# Patient Record
Sex: MALE | Race: BLACK OR AFRICAN AMERICAN | NOT HISPANIC OR LATINO | Employment: OTHER | URBAN - METROPOLITAN AREA
[De-identification: names, ages, dates, MRNs, and addresses within clinical notes are randomized per-mention and may not be internally consistent; named-entity substitution may affect disease eponyms.]

---

## 2017-01-13 ENCOUNTER — PATIENT OUTREACH (OUTPATIENT)
Dept: ADMINISTRATIVE | Facility: HOSPITAL | Age: 82
End: 2017-01-13

## 2017-01-13 DIAGNOSIS — E11.9 TYPE 2 DIABETES MELLITUS WITHOUT COMPLICATION: ICD-10-CM

## 2017-01-13 NOTE — PROGRESS NOTES
A urine micro bulk order was pended for this patient.    Last OV with Dr. Benavides 7/2016.  Due for an office visit with him, some labs for your diabetes, your annual diabetic eye exam, and possibly some immunizations (flu, pneumonia, shingles, and tetanus).  Letter/Mychart message sent to notify patient.    cmp done 7/2016

## 2017-01-13 NOTE — LETTER
January 16, 2017    James Morejon  [address] [place] [address]             [facility]  [address]  [facility] [address]  Phone: [phone] Dear Mr. Morejon:    [facility] to your overall [facility].  To help you get the most out of each of your visits, we will review your information to make sure you are up to date on all of your recommended tests and/or procedures.  We have Dr. Chidi Benavides listed as your primary care provider.     He has found that you may be due for an office visit with him, some labs for your diabetes, your annual diabetic eye exam, and possibly some immunizations (flu, pneumonia, shingles, and tetanus).     If you have had any of the above done at an outside facility, please let us know so I can update your record.  If you have a copy of these records, please provide a copy for us to scan into your chart.  If not, please provide that provider/facilities contact information so that we may obtain copies from that facility.     Otherwise, please schedule these appointments at your earliest convenience.      If you have any questions or concerns, please don't hesitate to call.    Thank you for letting us care for you,  Rosaura Worley LPN Clinical Care Coordinator  [facility] [place]  [phone]

## 2017-01-20 DIAGNOSIS — E11.9 TYPE 2 DIABETES MELLITUS WITHOUT COMPLICATION: ICD-10-CM

## 2017-02-20 ENCOUNTER — TELEPHONE (OUTPATIENT)
Dept: FAMILY MEDICINE | Facility: CLINIC | Age: 82
End: 2017-02-20

## 2017-02-20 NOTE — TELEPHONE ENCOUNTER
----- Message from Ramonita Toney sent at 2/20/2017  9:01 AM CST -----  Patients daughter states that she need to speak to you in reference to patients leg issues before scheduling an appointment.  Please call Danuta at [phone].

## 2017-02-20 NOTE — TELEPHONE ENCOUNTER
Spoke with daughter, she states that pt is out of his medication. Instructed her to go to pharmacy to  meds, pt has a year of refills from 7/2016. She states that pt needs to see Dr. Benavides for his legs. Spoke with Dr. Benavides whom said that pt can come in next week for his legs. Offered daughter Monday 2/27 and she refused so appt given for Wednesday 3/1

## 2017-02-20 NOTE — TELEPHONE ENCOUNTER
----- Message from Kristin Juan sent at 2/20/2017  2:43 PM CST -----  Patients daughter missed a call from office please call Danuta [phone]

## 2017-02-20 NOTE — TELEPHONE ENCOUNTER
----- Message from Orlando Abad sent at 2/20/2017  9:04 AM CST -----  Contact: Daughter-  Danuta - [phone]  Patient needs  refill on all prescribed medications with [facility] in [place]. The daughter did not know the names of the rx. Patient is out of medication for three days. Thanks!

## 2017-03-01 ENCOUNTER — OFFICE VISIT (OUTPATIENT)
Dept: FAMILY MEDICINE | Facility: CLINIC | Age: 82
End: 2017-03-01
Payer: MEDICARE

## 2017-03-01 VITALS
BODY MASS INDEX: 32.2 KG/M2 | HEIGHT: 69 IN | OXYGEN SATURATION: 94 % | WEIGHT: 217.38 LBS | DIASTOLIC BLOOD PRESSURE: 62 MMHG | HEART RATE: 100 BPM | SYSTOLIC BLOOD PRESSURE: 150 MMHG | TEMPERATURE: 100 F

## 2017-03-01 DIAGNOSIS — E11.42 TYPE 2 DIABETES MELLITUS WITH DIABETIC POLYNEUROPATHY, WITHOUT LONG-TERM CURRENT USE OF INSULIN: ICD-10-CM

## 2017-03-01 DIAGNOSIS — I87.2 PERIPHERAL VENOUS INSUFFICIENCY: Primary | ICD-10-CM

## 2017-03-01 DIAGNOSIS — I10 ESSENTIAL HYPERTENSION: ICD-10-CM

## 2017-03-01 DIAGNOSIS — I50.22 CHRONIC SYSTOLIC CONGESTIVE HEART FAILURE: ICD-10-CM

## 2017-03-01 PROCEDURE — 99214 OFFICE O/P EST MOD 30 MIN: CPT | Mod: S$PBB,,, | Performed by: FAMILY MEDICINE

## 2017-03-01 PROCEDURE — 99213 OFFICE O/P EST LOW 20 MIN: CPT | Mod: PBBFAC,PO | Performed by: FAMILY MEDICINE

## 2017-03-01 PROCEDURE — 99999 PR PBB SHADOW E&M-EST. PATIENT-LVL III: CPT | Mod: PBBFAC,,, | Performed by: FAMILY MEDICINE

## 2017-03-01 RX ORDER — CLOPIDOGREL BISULFATE 75 MG/1
75 TABLET ORAL DAILY
Qty: 90 TABLET | Refills: 1 | Status: SHIPPED | OUTPATIENT
Start: 2017-03-01 | End: 2017-10-02 | Stop reason: SDUPTHER

## 2017-03-01 RX ORDER — FUROSEMIDE 80 MG/1
80 TABLET ORAL DAILY
Qty: 90 TABLET | Refills: 1 | Status: SHIPPED | OUTPATIENT
Start: 2017-03-01 | End: 2017-10-02 | Stop reason: SDUPTHER

## 2017-03-01 RX ORDER — CILOSTAZOL 100 MG/1
100 TABLET ORAL 2 TIMES DAILY
Qty: 180 TABLET | Refills: 1 | Status: SHIPPED | OUTPATIENT
Start: 2017-03-01 | End: 2017-10-02 | Stop reason: SDUPTHER

## 2017-03-01 RX ORDER — METOPROLOL TARTRATE 50 MG/1
50 TABLET ORAL 2 TIMES DAILY
Qty: 180 TABLET | Refills: 1 | Status: SHIPPED | OUTPATIENT
Start: 2017-03-01 | End: 2017-10-02 | Stop reason: SDUPTHER

## 2017-03-01 RX ORDER — POTASSIUM CHLORIDE 750 MG/1
10 CAPSULE, EXTENDED RELEASE ORAL DAILY
Qty: 90 CAPSULE | Refills: 1 | Status: SHIPPED | OUTPATIENT
Start: 2017-03-01 | End: 2017-10-02 | Stop reason: SDUPTHER

## 2017-03-01 RX ORDER — TAMSULOSIN HYDROCHLORIDE 0.4 MG/1
0.8 CAPSULE ORAL
Qty: 180 CAPSULE | Refills: 1 | Status: SHIPPED | OUTPATIENT
Start: 2017-03-01 | End: 2017-10-02 | Stop reason: SDUPTHER

## 2017-03-01 RX ORDER — FINASTERIDE 5 MG/1
5 TABLET, FILM COATED ORAL DAILY
Qty: 90 TABLET | Refills: 1 | Status: SHIPPED | OUTPATIENT
Start: 2017-03-01 | End: 2017-10-02 | Stop reason: SDUPTHER

## 2017-03-01 RX ORDER — PRAVASTATIN SODIUM 40 MG/1
40 TABLET ORAL DAILY
Qty: 90 TABLET | Refills: 1 | Status: SHIPPED | OUTPATIENT
Start: 2017-03-01 | End: 2017-10-02 | Stop reason: SDUPTHER

## 2017-03-01 NOTE — MR AVS SNAPSHOT
[facility]  [address]  [place] [address]  Phone: [phone]  Fax: [phone]                  James Morejon   3/1/2017 3:30 PM   Office Visit    Description:  Male : 1935   Provider:  Chidi Benavides MD   Department:  [facility]           Reason for Visit     leg wound           Diagnoses this Visit        Comments    Peripheral venous insufficiency    -  Primary     Type 2 diabetes mellitus with diabetic polyneuropathy, without long-term current use of insulin         Essential hypertension         Chronic systolic congestive heart failure                To Do List           Future Appointments        Provider Department Dept Phone    3/9/2017 8:30 AM [facility] [place] [facility] [phone]    3/9/2017 10:30 AM Chidi Benavides MD [facility] [phone]      Goals (5 Years of Data)     None       These Medications        Disp Refills Start End    cilostazol (PLETAL) 100 MG Tab 180 tablet 1 3/1/2017 3/1/2018    Take 1 tablet (100 mg total) by mouth 2 (two) times daily. - Oral    [facility]: [id number] [address] Ph #: [phone]       clopidogrel (PLAVIX) 75 mg tablet 90 tablet 1 3/1/2017     Take 1 tablet (75 mg total) by mouth once daily. - Oral    [facility]: [id number] [address] Ph #: [phone]       finasteride (PROSCAR) 5 mg tablet 90 tablet 1 3/1/2017 3/1/2018    Take 1 tablet (5 mg total) by mouth once daily. - Oral    Ph[facility]: [id number] [address] Ph #: [phone]       furosemide (LASIX) 80 MG tablet 90 tablet 1 3/1/2017 3/1/2018    Take 1 tablet (80 mg total) by mouth once daily. - Oral    Ph[facility]: [id number] [address] Ph #: [phone]       metoprolol tartrate (LOPRESSOR) 50 MG tablet 180 tablet 1 3/1/2017 3/1/2018    Take 1 tablet (50 mg total) by  mouth 2 (two) times daily. - Oral    [facility]: [id number] [place] [address] Ph #: [phone]       potassium chloride (MICRO-K) 10 MEQ CpSR 90 capsule 1 3/1/2017     Take 1 capsule (10 mEq total) by mouth once daily. NOT TAKING - Oral    Ph[facility]acy: [facility] [place] [address] Ph #: [phone]       pravastatin (PRAVACHOL) 40 MG tablet 90 tablet 1 3/1/2017 3/1/2018    Take 1 tablet (40 mg total) by mouth once daily. - Oral    Ph[facility]: [facility] [place] [address] Ph #: [phone]       tamsulosin (FLOMAX) 0.4 mg Cp24 180 capsule 1 3/1/2017 3/1/2018    Take 2 capsules (0.8 mg total) by mouth after dinner. - Oral    Ph[facility]acy: [facility] [place] [address] Ph #: [phone]         [facility]     [facility] On Call Nurse Care Line - 24/7 Assistance  Registered nurses in the [facility] On Call Center provide clinical advisement, health education, appointment booking, and other advisory services.  Call for this free service at [phone].             Medications           Message regarding Medications     Verify the changes and/or additions to your medication regime listed below are the same as discussed with your clinician today.  If any of these changes or additions are incorrect, please notify your healthcare provider.             Verify that the below list of medications is an accurate representation of the medications you are currently taking.  If none reported, the list may be blank. If incorrect, please contact your healthcare provider. Carry this list with you in case of emergency.           Current Medications     acetaminophen (TYLENOL) 325 MG tablet Take 2 tablets (650 mg total) by mouth every 4 (four) hours as needed.    aspirin (ECOTRIN) 325 MG EC tablet Take 325 mg by mouth once daily.    cilostazol (PLETAL) 100 MG Tab Take 1 tablet (100 mg total) by mouth 2 (two) times daily.    clopidogrel  (PLAVIX) 75 mg tablet Take 1 tablet (75 mg total) by mouth once daily.    echinacea 400 mg Cap Take 400 mg by mouth once daily.    ferrous gluconate (FERGON) 324 MG tablet Take 324 mg by mouth daily with breakfast.     finasteride (PROSCAR) 5 mg tablet Take 1 tablet (5 mg total) by mouth once daily.    furosemide (LASIX) 80 MG tablet Take 1 tablet (80 mg total) by mouth once daily.    metoprolol tartrate (LOPRESSOR) 50 MG tablet Take 1 tablet (50 mg total) by mouth 2 (two) times daily.    multivitamin with minerals tablet Take 1 tablet by mouth once daily.    nitroGLYCERIN 2% TD oint (NITRO-BID) 2 % ointment Place 0.5 inches onto the skin 3 (three) times daily. NOT TAKING    potassium chloride (MICRO-K) 10 MEQ CpSR Take 1 capsule (10 mEq total) by mouth once daily. NOT TAKING    pravastatin (PRAVACHOL) 40 MG tablet Take 1 tablet (40 mg total) by mouth once daily.    tamsulosin (FLOMAX) 0.4 mg Cp24 Take 2 capsules (0.8 mg total) by mouth after dinner.           Clinical Reference Information           Your Vitals Were     BP                   150/62 (BP Location: Left arm, Patient Position: Sitting, BP Method: Manual)           Blood Pressure          Most Recent Value    BP  (!)  150/62      Allergies as of 3/1/2017     Ace Inhibitors    Cortisone      Immunizations Administered on Date of Encounter - 3/1/2017     None      Orders Placed During Today's Visit     Future Labs/Procedures Expected by Expires    CBC Without Differential  3/1/2017 3/2/2018    Comprehensive metabolic panel  3/1/2017 3/2/2018    Hemoglobin A1c  3/1/2017 3/2/2018    Lipid panel  3/1/2017 3/2/2018      [facility] Sign-Up     Activating your [facility] account is as easy as 1-2-3!     1) Visit [email or web address], select Sign Up Now, enter this activation code and your date of birth, then select Next.  [email or web address]  Expires: 4/15/2017  4:20 PM      2) Create a username and password to use when you visit [facility] in the future and select a  security question in case you lose your password and select Next.    3) Enter your e-mail address and click Sign Up!    Additional Information  If you have questions, please e-mail [email or web address] or call [phone] to talk to our [facility] staff. Remember, [facility] is NOT to be used for urgent needs. For medical emergencies, dial 911.         Language Assistance Services     ATTENTION: Language assistance services are available, free of charge. Please call [phone].      ATENCIÓN: Si habla español, tiene a zaragoza disposición servicios gratuitos de asistencia lingüística. Llame al [phone].     CHÚ Ý: N?u b?n nói Ti?ng Vi?t, có các d?ch v? h? tr? ngôn ng? mi?n phí dành cho b?n. G?i s? [phone].         [facility] complies with applicable Federal civil rights laws and does not discriminate on the basis of race, color, national origin, age, disability, or sex.

## 2017-03-01 NOTE — PROGRESS NOTES
"Subjective:       Patient ID: James Morejon is a 82 y.o. male.    Chief Complaint: leg wound    HPI Comments: He returns for his first visit in 8 months.  He is accompanied by his daughter.  He has been having an ulcer on his left anterior leg again.  He says he is out of medicines for the past week.  I reviewed the record and it looked like he had enough refills to get him through July 2017 with [facility] pharmacy.  He has a history of congestive heart failure but does not complain of dyspnea or orthopnea.  He says he has a good appetite.  No diarrhea or constipation.  No fever.  He had a cholecystectomy about 8 months ago.  No complaints of abdominal pain.    Review of Systems   Constitutional: Negative for fatigue, fever and unexpected weight change.   Respiratory: Negative for cough and shortness of breath.    Cardiovascular: Positive for leg swelling. Negative for chest pain and palpitations.   Gastrointestinal: Negative for abdominal distention and abdominal pain.   Neurological: Negative for headaches.       Objective:     Blood pressure (!) 150/62, pulse 100, temperature 99.6 °F (37.6 °C), temperature source Oral, height 5' 9" (1.753 m), weight 98.6 kg (217 lb 6 oz), SpO2 (!) 94 %.      Physical Exam   Constitutional:   He is overweight and in no distress.   Cardiovascular: Normal rate, regular rhythm and normal heart sounds.    No murmur heard.  No JVD.  No carotid bruit.   Musculoskeletal: He exhibits edema (3+ edema with stasis changes on the right.  4+ edema with a 4 cm ulcer anteriorly on the left.  It does not appear to be infected.).   Neurological: He is alert.   Psychiatric:   He is slow to answer and inaccurate but I think that is his baseline.       Assessment:       1. Peripheral venous insufficiency    2. Type 2 diabetes mellitus with diabetic polyneuropathy, without long-term current use of insulin    3. Essential hypertension    4. Chronic systolic congestive heart failure        Plan:     "   Right Ace wrap, left Unna boot with Ace wrap.  Return to clinic in 1 week for rewrap.  Draw lab work at that time.  I refilled all of his medicines [facility] in [place].

## 2017-03-09 ENCOUNTER — OFFICE VISIT (OUTPATIENT)
Dept: FAMILY MEDICINE | Facility: CLINIC | Age: 82
End: 2017-03-09
Payer: MEDICARE

## 2017-03-09 ENCOUNTER — LAB VISIT (OUTPATIENT)
Dept: LAB | Facility: HOSPITAL | Age: 82
End: 2017-03-09
Attending: FAMILY MEDICINE
Payer: MEDICARE

## 2017-03-09 VITALS
HEIGHT: 69 IN | HEART RATE: 60 BPM | BODY MASS INDEX: 31.77 KG/M2 | DIASTOLIC BLOOD PRESSURE: 68 MMHG | SYSTOLIC BLOOD PRESSURE: 116 MMHG | RESPIRATION RATE: 18 BRPM | WEIGHT: 214.5 LBS | TEMPERATURE: 99 F

## 2017-03-09 DIAGNOSIS — R60.0 BILATERAL LEG EDEMA: ICD-10-CM

## 2017-03-09 DIAGNOSIS — E11.42 TYPE 2 DIABETES MELLITUS WITH DIABETIC POLYNEUROPATHY, WITHOUT LONG-TERM CURRENT USE OF INSULIN: ICD-10-CM

## 2017-03-09 DIAGNOSIS — I10 ESSENTIAL HYPERTENSION: Primary | ICD-10-CM

## 2017-03-09 LAB
ALBUMIN SERPL BCP-MCNC: 2.9 G/DL
ALP SERPL-CCNC: 66 U/L
ALT SERPL W/O P-5'-P-CCNC: 6 U/L
ANION GAP SERPL CALC-SCNC: 6 MMOL/L
AST SERPL-CCNC: 15 U/L
BILIRUB SERPL-MCNC: 0.6 MG/DL
BUN SERPL-MCNC: 18 MG/DL
CALCIUM SERPL-MCNC: 9.4 MG/DL
CHLORIDE SERPL-SCNC: 104 MMOL/L
CHOLEST/HDLC SERPL: 2.9 {RATIO}
CO2 SERPL-SCNC: 33 MMOL/L
CREAT SERPL-MCNC: 1.1 MG/DL
ERYTHROCYTE [DISTWIDTH] IN BLOOD BY AUTOMATED COUNT: 16.9 %
EST. GFR  (AFRICAN AMERICAN): >60 ML/MIN/1.73 M^2
EST. GFR  (NON AFRICAN AMERICAN): >60 ML/MIN/1.73 M^2
GLUCOSE SERPL-MCNC: 86 MG/DL
HCT VFR BLD AUTO: 31.5 %
HDL/CHOLESTEROL RATIO: 34.2 %
HDLC SERPL-MCNC: 158 MG/DL
HDLC SERPL-MCNC: 54 MG/DL
HGB BLD-MCNC: 9.3 G/DL
LDLC SERPL CALC-MCNC: 93.2 MG/DL
MCH RBC QN AUTO: 24 PG
MCHC RBC AUTO-ENTMCNC: 29.5 %
MCV RBC AUTO: 81 FL
NONHDLC SERPL-MCNC: 104 MG/DL
PLATELET # BLD AUTO: 191 K/UL
PMV BLD AUTO: 10.3 FL
POTASSIUM SERPL-SCNC: 4.7 MMOL/L
PROT SERPL-MCNC: 8 G/DL
RBC # BLD AUTO: 3.87 M/UL
SODIUM SERPL-SCNC: 143 MMOL/L
TRIGL SERPL-MCNC: 54 MG/DL
WBC # BLD AUTO: 4.52 K/UL

## 2017-03-09 PROCEDURE — 99213 OFFICE O/P EST LOW 20 MIN: CPT | Mod: PBBFAC,PO | Performed by: FAMILY MEDICINE

## 2017-03-09 PROCEDURE — 99213 OFFICE O/P EST LOW 20 MIN: CPT | Mod: S$PBB,,, | Performed by: FAMILY MEDICINE

## 2017-03-09 PROCEDURE — 99999 PR PBB SHADOW E&M-EST. PATIENT-LVL III: CPT | Mod: PBBFAC,,, | Performed by: FAMILY MEDICINE

## 2017-03-09 NOTE — PROGRESS NOTES
"Subjective:       Patient ID: James Morejon is a 82 y.o. male.    Chief Complaint: Follow-up (1 week)    HPI Comments: He is here with his daughter.  Follow-up stasis ulcer.  An Unna boot was placed on the left leg and an Ace wrap on the right leg.  Neither one has been changed.  He feels fine.  He does not complain of shortness of breath or chest pain.  He had lab work drawn today.  They are driving to [place] in 3 days and will stay there for a month.  I reviewed his medicine list.    Review of Systems    Objective:     Blood pressure 116/68, pulse 60, temperature 98.7 °F (37.1 °C), temperature source Oral, resp. rate 18, height 5' 9" (1.753 m), weight 97.3 kg (214 lb 8.1 oz).      Physical Exam   Constitutional: He appears well-nourished. No distress.   Cardiovascular: Normal rate and normal heart sounds.    Pulses:       Dorsalis pedis pulses are 1+ on the right side, and 1+ on the left side.        Posterior tibial pulses are 1+ on the right side, and 1+ on the left side.   Frequent PVCs   Pulmonary/Chest: Effort normal and breath sounds normal. No respiratory distress.   Musculoskeletal:        Right foot: There is no deformity.        Left foot: There is no deformity.   His leg edema is well-controlled with the wraps.  Nontender.   Feet:   Right Foot:   Protective Sensation: 4 sites tested. 4 sites sensed.   Skin Integrity: Positive for dry skin. Negative for ulcer.   Left Foot:   Protective Sensation: 4 sites tested. 4 sites sensed.   Skin Integrity: Positive for dry skin (nychomycosis bilateral). Negative for ulcer.   Neurological: He is alert.   Skin:   His stasis ulcer is basically healed.  I cleaned the lower leg and rewrapped it with an Ace wrap.  They do have elastic stockings at home.   Psychiatric: He has a normal mood and affect.       Assessment:       1. Essential hypertension    2. Bilateral leg edema        Plan:       I rewrapped the left leg with an Ace wrap.  Home care instructions given.   "

## 2017-03-10 ENCOUNTER — TELEPHONE (OUTPATIENT)
Dept: FAMILY MEDICINE | Facility: CLINIC | Age: 82
End: 2017-03-10

## 2017-03-10 LAB
ESTIMATED AVG GLUCOSE: 123 MG/DL
HBA1C MFR BLD HPLC: 5.9 %

## 2017-07-07 ENCOUNTER — PATIENT OUTREACH (OUTPATIENT)
Dept: ADMINISTRATIVE | Facility: HOSPITAL | Age: 82
End: 2017-07-07

## 2017-07-07 NOTE — PROGRESS NOTES
DM bulk communication non [facility], due a urine test for your kidneys, your annual diabetic eye exam, and possibly some immunizations (pneumonia, shingles, and tetanus)

## 2017-07-07 NOTE — LETTER
July 7, 2017    James Morejon  [address] [place] [address]             [facility]  [address]  [place] [address]  Phone: [phone] Dear Mr. Morejon:    [facility] committed to your overall [facility].  To help you get the most out of each of your visits, we will review your information to make sure you are up to date on all of your recommended tests and/or procedures.      We have Dr. Chidi Benavides listed as your primary care provider.  He has found that you may be due for a urine test for your kidneys, your annual diabetic eye exam, and possibly some immunizations (pneumonia, shingles, and tetanus).     Medicare does not cover all immunizations to be given in the clinic.  Check your benefits to ensure that you do not need to receive your immunizations at the pharmacy.    If you have had any of the above done at an outside facility, please let us know so I can update your record.  If you have a copy of these records, please provide a copy for us to scan into your chart.  If not, please provide that provider/facilities contact information so that we may obtain copies from that facility.     Otherwise, please schedule these appointments at your earliest convenience.  You are due for your diabetic follow up with Dr. Benavides in September of 2017.    If you have any questions or concerns, please don't hesitate to call.    Thank you for letting us care for you,  Rosaura Worley LPN Clinical Care Coordinator  [facility] [place]  [phone]

## 2017-08-30 ENCOUNTER — TELEPHONE (OUTPATIENT)
Dept: FAMILY MEDICINE | Facility: CLINIC | Age: 82
End: 2017-08-30

## 2017-08-30 ENCOUNTER — PATIENT OUTREACH (OUTPATIENT)
Dept: ADMINISTRATIVE | Facility: HOSPITAL | Age: 82
End: 2017-08-30

## 2017-08-30 DIAGNOSIS — E11.42 TYPE 2 DIABETES MELLITUS WITH DIABETIC POLYNEUROPATHY, WITHOUT LONG-TERM CURRENT USE OF INSULIN: Primary | ICD-10-CM

## 2017-08-30 NOTE — LETTER
August 31, 2017    James Morejon  [address]  [place] [address]             [facility] [address] [address]  Phone: [phone]  Fax: [phone] Dear Mr. Morejon:    This is a letter to remind you about your upcoming lab appointment on 10/2/2017 at [address] [facility] [place] [address]. Then you have an appointment with Dr. Benavides at [address] [place] [address] [place] [address] at 11:00 am.    If you have any questions or concerns, please don't hesitate to call.    Sincerely,        Shaylee Hernandez LPN

## 2017-08-30 NOTE — LETTER
August 31, 2017    James Morejon  [address]  [place] [address]             [facility]  [address]  [facility] [address]  Phone: [phone] Dear Mr. Morejon:    [facility] to your overall [facility].  To help you get the most out of each of your visits, we will review your information to make sure you are up to date on all of your recommended tests and/or procedures.      We have Dr. Chidi Benavides listed as your primary care provider.  He has found that you may be due for an office visit with him, a urine test for your kidneys, your annual diabetic eye exam, and possibly some immunizations (tetanus, shingles, and penumonia).     Medicare does not cover all immunizations to be given in the clinic.  Check your benefits to ensure that you do not need to receive your immunizations at the pharmacy.    If you have had any of the above done at an outside facility, please let us know so I can update your record.  If you have a copy of these records, please provide a copy for us to scan into your chart.  If not, please provide that provider/facilities contact information so that we may obtain copies from that facility.     Otherwise, please schedule these appointments at your earliest convenience.      If you have any questions or concerns, please don't hesitate to call.    Thank you for letting us care for you,  Rosaura Worley LPN Clinical Care Coordinator  [facility] [place]  [phone]

## 2017-08-30 NOTE — TELEPHONE ENCOUNTER
----- Message from Anne Ellison sent at 8/30/2017  3:44 PM CDT -----  Contact: Daughter,Danuta  Patient need orders for blood work, please call Danuta after you put orders in at [phone] (home)

## 2017-08-30 NOTE — PROGRESS NOTES
DM non [facility] bulk communication, due an office visit with him, a urine test for your kidneys, your annual diabetic eye exam, and possibly some immunizations (tetanus, shingles, and penumonia)    Last ov Kennedy 3/2017, 2nd attempt

## 2017-08-31 NOTE — TELEPHONE ENCOUNTER
Pt is needing orders for labs prior to appt on 10/02/2017.     Please review and advise.     Thanks.

## 2017-09-18 ENCOUNTER — PATIENT OUTREACH (OUTPATIENT)
Dept: ADMINISTRATIVE | Facility: HOSPITAL | Age: 82
End: 2017-09-18

## 2017-09-18 NOTE — LETTER
September 18, 2017    James Morejon  [address]  [place] [address]             [facility]  [address]  [facility] [address]  Phone: [phone] Dear Mr. Morejon:    [facility] to your overall [facility].  To help you get the most out of each of your visits, we will review your information to make sure you are up to date on all of your recommended tests and/or procedures.      Dr. Chidi Benavides has found that you may be due for your annual diabetic eye exam and possibly some immunizations (tetanus, shingles, pneumonia, and flu).     Medicare does not cover all immunizations to be given in the clinic.  Check your benefits to ensure that you do not need to receive your immunizations at the pharmacy.    If you have had any of the above done at another facility, please bring the records or information with you so that your record at [facility] will be complete.  If you would like to schedule any of these, please contact me.    If you are currently taking medication, please bring it with you to your appointment for review.    Also, if you have any type of Advanced Directives, please bring them with you to your office visit so we may scan them into your chart.    If you have any questions or concerns, please don't hesitate to call.    Thank you for letting us care for you,  Rosaura Worley LPN Clinical Care Coordinator  [facility] [place]  [phone]

## 2017-10-02 ENCOUNTER — LAB VISIT (OUTPATIENT)
Dept: LAB | Facility: HOSPITAL | Age: 82
End: 2017-10-02
Attending: FAMILY MEDICINE
Payer: MEDICARE

## 2017-10-02 ENCOUNTER — OFFICE VISIT (OUTPATIENT)
Dept: FAMILY MEDICINE | Facility: CLINIC | Age: 82
End: 2017-10-02
Payer: MEDICARE

## 2017-10-02 VITALS
BODY MASS INDEX: 32.24 KG/M2 | DIASTOLIC BLOOD PRESSURE: 82 MMHG | WEIGHT: 217.63 LBS | SYSTOLIC BLOOD PRESSURE: 138 MMHG | TEMPERATURE: 99 F | HEIGHT: 69 IN

## 2017-10-02 DIAGNOSIS — E11.42 TYPE 2 DIABETES MELLITUS WITH DIABETIC POLYNEUROPATHY, WITHOUT LONG-TERM CURRENT USE OF INSULIN: ICD-10-CM

## 2017-10-02 DIAGNOSIS — L97.911 NON-PRESSURE CHRONIC ULCER OF RIGHT LOWER LEG, LIMITED TO BREAKDOWN OF SKIN: ICD-10-CM

## 2017-10-02 DIAGNOSIS — I71.40 ABDOMINAL AORTIC ANEURYSM WITHOUT RUPTURE: ICD-10-CM

## 2017-10-02 DIAGNOSIS — I25.10 CORONARY ARTERY DISEASE INVOLVING NATIVE CORONARY ARTERY WITHOUT ANGINA PECTORIS, UNSPECIFIED WHETHER NATIVE OR TRANSPLANTED HEART: ICD-10-CM

## 2017-10-02 DIAGNOSIS — I50.22 CHRONIC SYSTOLIC CONGESTIVE HEART FAILURE: ICD-10-CM

## 2017-10-02 DIAGNOSIS — I10 ESSENTIAL HYPERTENSION: Primary | ICD-10-CM

## 2017-10-02 DIAGNOSIS — I87.2 PERIPHERAL VENOUS INSUFFICIENCY: ICD-10-CM

## 2017-10-02 DIAGNOSIS — R60.0 BILATERAL LEG EDEMA: ICD-10-CM

## 2017-10-02 DIAGNOSIS — E11.9 TYPE 2 DIABETES MELLITUS WITHOUT COMPLICATION: ICD-10-CM

## 2017-10-02 LAB
ALBUMIN SERPL BCP-MCNC: 2.8 G/DL
ALP SERPL-CCNC: 71 U/L
ALT SERPL W/O P-5'-P-CCNC: 7 U/L
ANION GAP SERPL CALC-SCNC: 7 MMOL/L
AST SERPL-CCNC: 19 U/L
BILIRUB SERPL-MCNC: 0.9 MG/DL
BUN SERPL-MCNC: 18 MG/DL
CALCIUM SERPL-MCNC: 9.2 MG/DL
CHLORIDE SERPL-SCNC: 100 MMOL/L
CO2 SERPL-SCNC: 33 MMOL/L
CREAT SERPL-MCNC: 1.1 MG/DL
ERYTHROCYTE [DISTWIDTH] IN BLOOD BY AUTOMATED COUNT: 18.9 %
EST. GFR  (AFRICAN AMERICAN): >60 ML/MIN/1.73 M^2
EST. GFR  (NON AFRICAN AMERICAN): >60 ML/MIN/1.73 M^2
GLUCOSE SERPL-MCNC: 81 MG/DL
HCT VFR BLD AUTO: 32.9 %
HGB BLD-MCNC: 10.1 G/DL
MCH RBC QN AUTO: 24.4 PG
MCHC RBC AUTO-ENTMCNC: 30.7 G/DL
MCV RBC AUTO: 80 FL
PLATELET # BLD AUTO: 220 K/UL
PMV BLD AUTO: 10.1 FL
POTASSIUM SERPL-SCNC: 4 MMOL/L
PROT SERPL-MCNC: 8.6 G/DL
RBC # BLD AUTO: 4.14 M/UL
SODIUM SERPL-SCNC: 140 MMOL/L
WBC # BLD AUTO: 4.48 K/UL

## 2017-10-02 PROCEDURE — 99999 PR PBB SHADOW E&M-EST. PATIENT-LVL III: CPT | Mod: PBBFAC,,, | Performed by: FAMILY MEDICINE

## 2017-10-02 PROCEDURE — 90670 PCV13 VACCINE IM: CPT | Mod: PBBFAC,PN

## 2017-10-02 PROCEDURE — 99213 OFFICE O/P EST LOW 20 MIN: CPT | Mod: PBBFAC,PN | Performed by: FAMILY MEDICINE

## 2017-10-02 PROCEDURE — 85027 COMPLETE CBC AUTOMATED: CPT

## 2017-10-02 PROCEDURE — 83036 HEMOGLOBIN GLYCOSYLATED A1C: CPT

## 2017-10-02 PROCEDURE — 80053 COMPREHEN METABOLIC PANEL: CPT

## 2017-10-02 PROCEDURE — 3079F DIAST BP 80-89 MM HG: CPT | Mod: ,,, | Performed by: FAMILY MEDICINE

## 2017-10-02 PROCEDURE — 1159F MED LIST DOCD IN RCRD: CPT | Mod: ,,, | Performed by: FAMILY MEDICINE

## 2017-10-02 PROCEDURE — 36415 COLL VENOUS BLD VENIPUNCTURE: CPT | Mod: PO

## 2017-10-02 PROCEDURE — 99214 OFFICE O/P EST MOD 30 MIN: CPT | Mod: S$PBB,,, | Performed by: FAMILY MEDICINE

## 2017-10-02 PROCEDURE — 1126F AMNT PAIN NOTED NONE PRSNT: CPT | Mod: ,,, | Performed by: FAMILY MEDICINE

## 2017-10-02 PROCEDURE — 99499 UNLISTED E&M SERVICE: CPT | Mod: S$PBB,,, | Performed by: FAMILY MEDICINE

## 2017-10-02 PROCEDURE — 3075F SYST BP GE 130 - 139MM HG: CPT | Mod: ,,, | Performed by: FAMILY MEDICINE

## 2017-10-02 PROCEDURE — G0009 ADMIN PNEUMOCOCCAL VACCINE: HCPCS | Mod: PBBFAC,PN

## 2017-10-02 RX ORDER — FINASTERIDE 5 MG/1
5 TABLET, FILM COATED ORAL DAILY
Qty: 90 TABLET | Refills: 1 | Status: SHIPPED | OUTPATIENT
Start: 2017-10-02 | End: 2018-09-20 | Stop reason: SDUPTHER

## 2017-10-02 RX ORDER — TAMSULOSIN HYDROCHLORIDE 0.4 MG/1
0.8 CAPSULE ORAL
Qty: 180 CAPSULE | Refills: 1 | Status: SHIPPED | OUTPATIENT
Start: 2017-10-02 | End: 2018-10-12 | Stop reason: SDUPTHER

## 2017-10-02 RX ORDER — CLOPIDOGREL BISULFATE 75 MG/1
75 TABLET ORAL DAILY
Qty: 90 TABLET | Refills: 1 | Status: SHIPPED | OUTPATIENT
Start: 2017-10-02 | End: 2018-09-20 | Stop reason: SDUPTHER

## 2017-10-02 RX ORDER — POTASSIUM CHLORIDE 750 MG/1
10 CAPSULE, EXTENDED RELEASE ORAL DAILY
Qty: 90 CAPSULE | Refills: 1 | Status: SHIPPED | OUTPATIENT
Start: 2017-10-02 | End: 2018-10-12 | Stop reason: SDUPTHER

## 2017-10-02 RX ORDER — PRAVASTATIN SODIUM 40 MG/1
40 TABLET ORAL DAILY
Qty: 90 TABLET | Refills: 1 | Status: SHIPPED | OUTPATIENT
Start: 2017-10-02 | End: 2018-09-20 | Stop reason: SDUPTHER

## 2017-10-02 RX ORDER — METOPROLOL TARTRATE 50 MG/1
50 TABLET ORAL 2 TIMES DAILY
Qty: 180 TABLET | Refills: 1 | Status: SHIPPED | OUTPATIENT
Start: 2017-10-02 | End: 2018-09-20 | Stop reason: SDUPTHER

## 2017-10-02 RX ORDER — FUROSEMIDE 80 MG/1
80 TABLET ORAL DAILY
Qty: 90 TABLET | Refills: 1 | Status: SHIPPED | OUTPATIENT
Start: 2017-10-02 | End: 2018-06-20 | Stop reason: SDUPTHER

## 2017-10-02 RX ORDER — CILOSTAZOL 100 MG/1
100 TABLET ORAL 2 TIMES DAILY
Qty: 180 TABLET | Refills: 1 | Status: SHIPPED | OUTPATIENT
Start: 2017-10-02 | End: 2018-10-12 | Stop reason: SDUPTHER

## 2017-10-02 NOTE — PROGRESS NOTES
"Subjective:       Patient ID: James Morejon is a 82 y.o. male.    Chief Complaint: Annual Exam (Annual check up. Lab done today. Due for flu and pneumo vac)    He is here with his daughter for follow-up.  They are currently living in [facility].  He has a history of diastolic heart failure but has no symptoms of shortness of breath or chest pain.  He does have chronic leg edema with recurring stasis ulcers.  He has been unable to wear support stockings.  He tries to elevate his legs.  Currently no leg pain.  He has diabetes which is diet controlled.      Review of Systems   Constitutional: Negative for appetite change, fever and unexpected weight change.   Respiratory: Negative for cough and shortness of breath.    Cardiovascular: Positive for leg swelling. Negative for chest pain and palpitations.   Gastrointestinal: Negative for abdominal distention and abdominal pain.       Objective:     Blood pressure 138/82, temperature 99 °F (37.2 °C), temperature source Oral, height 5' 9" (1.753 m), weight 98.7 kg (217 lb 9.5 oz).      Physical Exam   Constitutional: He appears well-developed and well-nourished. No distress.   Cardiovascular: Normal rate, regular rhythm and normal heart sounds.    No murmur heard.  No carotid bruits.   Pulmonary/Chest: Effort normal and breath sounds normal. No respiratory distress.   Abdominal: Soft. Bowel sounds are normal. He exhibits no distension. There is no tenderness.   Musculoskeletal: He exhibits edema (ilateral leg edema.  Nontender.  Severe stasis dermatitis.  Some shallow ulcerations on the right which are chronic.).       Assessment:       1. Essential hypertension    2. Type 2 diabetes mellitus with diabetic polyneuropathy, without long-term current use of insulin    3. Bilateral leg edema    4. 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 Abdominal aortic aneurysm without rupture no abdominal pain or back pain.     6. Peripheral venous " insufficiency    7. Coronary artery disease involving native coronary artery without angina pectoris, unspecified whether native or transplanted heart    8. Chronic systolic congestive heart failure        Plan:       His lab work was drawn today.    I will send him a letter with lab results.  I refilled all of his medications.

## 2017-10-03 LAB
ESTIMATED AVG GLUCOSE: 117 MG/DL
ESTIMATED AVG GLUCOSE: 117 MG/DL
HBA1C MFR BLD HPLC: 5.7 %
HBA1C MFR BLD HPLC: 5.7 %

## 2017-11-02 ENCOUNTER — TELEPHONE (OUTPATIENT)
Dept: FAMILY MEDICINE | Facility: CLINIC | Age: 82
End: 2017-11-02

## 2017-11-02 NOTE — TELEPHONE ENCOUNTER
Spoke w/ pt's dtr. Pt is having some RLE edema w/ one small area of blistering. She made an appt for pt to see wound care at [facility] for Monday. Appt made for 1040am tomorrow w/ Dr Qiu for eval. Advised dtr not to cancel Wound care appt yet, if Dr Qiu thinks pt doesn't need to  see wound care, she can then cancel it. She agreed.

## 2017-11-02 NOTE — TELEPHONE ENCOUNTER
----- Message from Nalini Blakely sent at 11/2/2017 10:19 AM CDT -----  Contact: Patient's daughter, Danuta  Patient's daughter is calling because patient is experiencing some swelling and pain with right leg.  Trying to get in to see doctor tomorrow or Monday, please call back.  Call Back#[phone]  Thanks

## 2017-11-03 ENCOUNTER — OFFICE VISIT (OUTPATIENT)
Dept: FAMILY MEDICINE | Facility: CLINIC | Age: 82
End: 2017-11-03
Payer: MEDICARE

## 2017-11-03 VITALS
HEIGHT: 69 IN | SYSTOLIC BLOOD PRESSURE: 158 MMHG | DIASTOLIC BLOOD PRESSURE: 84 MMHG | WEIGHT: 225.75 LBS | HEART RATE: 84 BPM | TEMPERATURE: 99 F | BODY MASS INDEX: 33.44 KG/M2

## 2017-11-03 DIAGNOSIS — I50.32 CHRONIC DIASTOLIC CONGESTIVE HEART FAILURE: ICD-10-CM

## 2017-11-03 DIAGNOSIS — R60.0 BILATERAL LEG EDEMA: Primary | ICD-10-CM

## 2017-11-03 DIAGNOSIS — I83.029 STASIS ULCER OF LEFT LOWER EXTREMITY: ICD-10-CM

## 2017-11-03 DIAGNOSIS — L97.929 STASIS ULCER OF LEFT LOWER EXTREMITY: ICD-10-CM

## 2017-11-03 PROCEDURE — 99214 OFFICE O/P EST MOD 30 MIN: CPT | Mod: S$PBB,,, | Performed by: INTERNAL MEDICINE

## 2017-11-03 PROCEDURE — 99499 UNLISTED E&M SERVICE: CPT | Mod: S$PBB,,, | Performed by: INTERNAL MEDICINE

## 2017-11-03 PROCEDURE — 99999 PR PBB SHADOW E&M-EST. PATIENT-LVL IV: CPT | Mod: PBBFAC,,, | Performed by: INTERNAL MEDICINE

## 2017-11-03 PROCEDURE — 99214 OFFICE O/P EST MOD 30 MIN: CPT | Mod: PBBFAC,PN | Performed by: INTERNAL MEDICINE

## 2017-11-03 NOTE — PROGRESS NOTES
Assessment and Plan:    1. Bilateral leg edema  Worsened chronic edema 2/2 diastolic heart failure and venous insufficiency in the setting of dietary non-compliance and not elevating his legs as he is supposed to. Has gained 8 lbs since his visit 1 month ago. No other signs of heart failure exacerbation. Advised patient to increase dose of lasix to 80 mg BID for the next 3 days and monitor weight. Call if weight is not decreasing or if he develops shortness of breath or other new symptoms. Will check BMP today and next week to monitor Cr with increased lasix dosing.     2. Stasis ulcer of left lower extremity  Very small areas of epithelial destruction overlying tense, weeping edema. No signs of infection. Unna boots today, and will have home health come for wound care.   - Ambulatory referral to Home Health    3. Chronic diastolic congestive heart failure  - Ambulatory referral to Home Health  - Basic metabolic panel; Future  - Basic metabolic panel; Future  - Basic metabolic panel      ______________________________________________________________________  Subjective:    Chief Complaint:  Leg swelling and pain    HPI:  James is a 82 y.o. year old man, established patient of Dr. Benavides, presenting for evaluation of Bilateral LE swelling and pain.     Pre review of previous notes, he was seen last month and had known severe stasis dermatitis as well as significant bilateral LE edema with some shallow chronic ulcerations. In the past, he has been unable to wear support stockings and has been instructed to elevated his legs. He has known diastolic heart failure, last LVEF was normal.    He notes that his legs became much more swollen in the last 3 days. This was during the time that his daughter was out of town so he reports that he was not propping up his feet and was eating anything that the people from [facility] brought. Was eating a lot of salt. He notes that his legs periodically throb, and this is painful to him.  Pain is worse on the right than on the left, and he indicates an area on his lateral RLE that he thinks feels like it is going to become a wound.     Medications:  Current Outpatient Prescriptions on File Prior to Visit   Medication Sig Dispense Refill    aspirin (ECOTRIN) 325 MG EC tablet Take 325 mg by mouth once daily.      cilostazol (PLETAL) 100 MG Tab Take 1 tablet (100 mg total) by mouth 2 (two) times daily. 180 tablet 1    clopidogrel (PLAVIX) 75 mg tablet Take 1 tablet (75 mg total) by mouth once daily. 90 tablet 1    echinacea 400 mg Cap Take 400 mg by mouth once daily.      ferrous gluconate (FERGON) 324 MG tablet Take 324 mg by mouth daily with breakfast.       finasteride (PROSCAR) 5 mg tablet Take 1 tablet (5 mg total) by mouth once daily. 90 tablet 1    furosemide (LASIX) 80 MG tablet Take 1 tablet (80 mg total) by mouth once daily. 90 tablet 1    metoprolol tartrate (LOPRESSOR) 50 MG tablet Take 1 tablet (50 mg total) by mouth 2 (two) times daily. 180 tablet 1    multivitamin with minerals tablet Take 1 tablet by mouth once daily.      potassium chloride (MICRO-K) 10 MEQ CpSR Take 1 capsule (10 mEq total) by mouth once daily. NOT TAKING 90 capsule 1    pravastatin (PRAVACHOL) 40 MG tablet Take 1 tablet (40 mg total) by mouth once daily. 90 tablet 1    tamsulosin (FLOMAX) 0.4 mg Cp24 Take 2 capsules (0.8 mg total) by mouth after dinner. 180 capsule 1    acetaminophen (TYLENOL) 325 MG tablet Take 2 tablets (650 mg total) by mouth every 4 (four) hours as needed.  0    nitroGLYCERIN 2% TD oint (NITRO-BID) 2 % ointment Place 0.5 inches onto the skin 3 (three) times daily. NOT TAKING       No current facility-administered medications on file prior to visit.        Review of Systems:  Review of Systems   Constitutional: Negative for chills and fever.   Respiratory: Negative for shortness of breath and wheezing.    Cardiovascular: Positive for leg swelling. Negative for chest pain.  "  Gastrointestinal: Negative for constipation and diarrhea.   Neurological: Negative for seizures and syncope.       Past Medical History:  Past Medical History:   Diagnosis Date    Cancer     Coronary artery disease     Diabetes mellitus     Hypertension     PVD (peripheral vascular disease) 3/20/2013       Objective:    Vitals:  Vitals:    11/03/17 1032 11/03/17 1145   BP: (!) 152/84 (!) 158/84   Pulse: 84    Temp: 98.7 °F (37.1 °C)    Weight: 102.4 kg (225 lb 12 oz)    Height: 5' 9" (1.753 m)    PainSc: 0-No pain        Physical Exam   Constitutional: He is oriented to person, place, and time. He appears well-developed and well-nourished. No distress.   HENT:   Mouth/Throat: Oropharynx is clear and moist.   Eyes: No scleral icterus.   Cardiovascular: Normal rate and regular rhythm.    pulses in bilateral lower extremities non-palpable due to swelling   Pulmonary/Chest: Effort normal and breath sounds normal. No respiratory distress. He has no wheezes. He has no rales.   Musculoskeletal: He exhibits edema (3+ tense pitting edema with diffuse weeping).   Neurological: He is alert and oriented to person, place, and time.   Skin:   severe bilateral stasis dermatitis, area that patient indicates is the most painful is weeping more than the surrounding areas and has 2 very small areas (<0.5cm) of epithelial disruption   Psychiatric: He has a normal mood and affect. His behavior is normal.   Vitals reviewed.      Data:  Previous labs reviewed and pertinent for normal Cr 1.1.      Celia Qiu MD  Internal Medicine  "

## 2017-11-03 NOTE — PATIENT INSTRUCTIONS
Increase the lasix to 80 mg twice a day for the next 3 days.     We will check labs today, and again in 1 week at [facility].     We will order home health for wound care.     You need to avoid eating salty foods, and keep your legs elevated as much as possible.     If you continue to have pain in your legs, please take the tylenol (650 mg as needed every 6 hours).

## 2017-11-06 ENCOUNTER — TELEPHONE (OUTPATIENT)
Dept: FAMILY MEDICINE | Facility: CLINIC | Age: 82
End: 2017-11-06

## 2017-11-06 NOTE — TELEPHONE ENCOUNTER
----- Message from Charlotte Finney sent at 11/6/2017 10:19 AM CST -----  Contact: Daughter Danuta   Daughter is needing a call back to discuss Home Health for patient. She was waiting for a call back      Please call back  Before 12pm  [phone]

## 2017-11-06 NOTE — TELEPHONE ENCOUNTER
Patient has not heard from home health  The address that pt resides is  [address] [place] [id number]

## 2017-11-07 ENCOUNTER — TELEPHONE (OUTPATIENT)
Dept: FAMILY MEDICINE | Facility: CLINIC | Age: 82
End: 2017-11-07

## 2017-11-07 NOTE — TELEPHONE ENCOUNTER
----- Message from Shelia Tang sent at 11/7/2017  3:15 PM CST -----  Contact: Cecile with [facility] [phone]  Cecile with [facility] [phone] Calling because she needs to know what Wound Care they would like to order, and also needing to know if Physical Therapy was ordered. Please advise. Thanks.

## 2017-11-07 NOTE — TELEPHONE ENCOUNTER
----- Message from Yuli Flower sent at 11/7/2017  8:43 AM CST -----  Contact: Danuta Morejon (Daughter) [phone]  Danuta Morejon (Daughter) [phone], requesting a call from nurse stated its very important, in regards to home health.

## 2017-11-07 NOTE — TELEPHONE ENCOUNTER
Spoke with [facility] home health in pt's area and they will be calling pt to set up home visit.   Referral faxed to [phone]

## 2017-12-08 DIAGNOSIS — E11.9 DIABETES MELLITUS WITHOUT COMPLICATION: ICD-10-CM

## 2017-12-08 DIAGNOSIS — Z13.5 DIABETIC RETINOPATHY SCREENING: ICD-10-CM

## 2017-12-20 ENCOUNTER — PATIENT OUTREACH (OUTPATIENT)
Dept: ADMINISTRATIVE | Facility: HOSPITAL | Age: 82
End: 2017-12-20

## 2017-12-20 NOTE — LETTER
December 20, 2017    James Morejon  [address]  [place] [address]             [facility]  [address]  [facility] [address]  Phone: [phone] Dear Mr. Morejon:    [facility] is committed to your overall health.  To help you get the most out of each of your visits, we will review your information to make sure you are up to date on all of your recommended tests and/or procedures.      Dr. Benavides has found that your chart shows you may be due for annual dilated eye exam, tetanus and shingles vaccine.    If you have not had an eye exam in the past year, we now have a  Retinal Camera at the [facility].  If we do this exam the same day you see a member of your Primary Care team, we can save you from having to pay a second co pay.      Medicare does not cover all immunizations to be given in the clinic.  Check your benefits to ensure that you do not need to receive your immunizations at the pharmacy.      If you have had any of the above done at another facility, please bring the records or information with you so that your record at [facility] will be complete.  If you would like to schedule any of these, please contact me.    If you are currently taking medication, please bring it with you to your appointment for review.    Also, if you have any type of Advanced Directives, please bring them with you to your office visit so we may scan them into your chart.        If you have any questions or concerns, please don't hesitate to call.    Sincerely,    Jose Enrique Galindo LPN Clinical Care Coordinator  [facility] [address] [facility] [address]  [place] [address]  [phone] (p)   [phone] (f)

## 2018-02-16 ENCOUNTER — TELEPHONE (OUTPATIENT)
Dept: FAMILY MEDICINE | Facility: CLINIC | Age: 83
End: 2018-02-16

## 2018-02-22 ENCOUNTER — LAB VISIT (OUTPATIENT)
Dept: LAB | Facility: HOSPITAL | Age: 83
End: 2018-02-22
Attending: FAMILY MEDICINE
Payer: MEDICARE

## 2018-02-22 ENCOUNTER — OFFICE VISIT (OUTPATIENT)
Dept: FAMILY MEDICINE | Facility: CLINIC | Age: 83
End: 2018-02-22
Payer: MEDICARE

## 2018-02-22 VITALS
HEIGHT: 69 IN | SYSTOLIC BLOOD PRESSURE: 130 MMHG | BODY MASS INDEX: 35.33 KG/M2 | DIASTOLIC BLOOD PRESSURE: 74 MMHG | TEMPERATURE: 99 F | HEART RATE: 68 BPM | WEIGHT: 238.56 LBS

## 2018-02-22 DIAGNOSIS — E11.42 TYPE 2 DIABETES MELLITUS WITH DIABETIC POLYNEUROPATHY, WITHOUT LONG-TERM CURRENT USE OF INSULIN: ICD-10-CM

## 2018-02-22 DIAGNOSIS — I10 ESSENTIAL HYPERTENSION: ICD-10-CM

## 2018-02-22 DIAGNOSIS — L97.929 STASIS ULCER OF LEFT LOWER EXTREMITY: Primary | ICD-10-CM

## 2018-02-22 DIAGNOSIS — I87.2 PERIPHERAL VENOUS INSUFFICIENCY: ICD-10-CM

## 2018-02-22 DIAGNOSIS — I83.029 STASIS ULCER OF LEFT LOWER EXTREMITY: Primary | ICD-10-CM

## 2018-02-22 LAB
ERYTHROCYTE [DISTWIDTH] IN BLOOD BY AUTOMATED COUNT: 17.3 %
ESTIMATED AVG GLUCOSE: 117 MG/DL
HBA1C MFR BLD HPLC: 5.7 %
HCT VFR BLD AUTO: 32.1 %
HGB BLD-MCNC: 9.6 G/DL
MCH RBC QN AUTO: 25.7 PG
MCHC RBC AUTO-ENTMCNC: 29.9 G/DL
MCV RBC AUTO: 86 FL
PLATELET # BLD AUTO: 172 K/UL
PMV BLD AUTO: 12.2 FL
RBC # BLD AUTO: 3.74 M/UL
WBC # BLD AUTO: 4.61 K/UL

## 2018-02-22 PROCEDURE — 83036 HEMOGLOBIN GLYCOSYLATED A1C: CPT

## 2018-02-22 PROCEDURE — 1159F MED LIST DOCD IN RCRD: CPT | Mod: ,,, | Performed by: FAMILY MEDICINE

## 2018-02-22 PROCEDURE — 1126F AMNT PAIN NOTED NONE PRSNT: CPT | Mod: ,,, | Performed by: FAMILY MEDICINE

## 2018-02-22 PROCEDURE — 99999 PR PBB SHADOW E&M-EST. PATIENT-LVL III: CPT | Mod: PBBFAC,,, | Performed by: FAMILY MEDICINE

## 2018-02-22 PROCEDURE — 36415 COLL VENOUS BLD VENIPUNCTURE: CPT | Mod: PN

## 2018-02-22 PROCEDURE — 29580 STRAPPING UNNA BOOT: CPT | Mod: PBBFAC,PN | Performed by: FAMILY MEDICINE

## 2018-02-22 PROCEDURE — 85027 COMPLETE CBC AUTOMATED: CPT

## 2018-02-22 PROCEDURE — 99213 OFFICE O/P EST LOW 20 MIN: CPT | Mod: PBBFAC,PN,25 | Performed by: FAMILY MEDICINE

## 2018-02-22 PROCEDURE — 99214 OFFICE O/P EST MOD 30 MIN: CPT | Mod: S$PBB,25,, | Performed by: FAMILY MEDICINE

## 2018-02-22 NOTE — PATIENT INSTRUCTIONS
Lasix 80 mg, 2 tablets in am.  Weight daily. Aim for 2# of weight loss per day for a total of 20#.

## 2018-02-22 NOTE — PROGRESS NOTES
"Subjective:       Patient ID: James Morejon is a 83 y.o. male.    Chief Complaint: Follow-up and Medication Problem (pt has not been taking aspirin)    FU leg edema. I saw him in October and Dr. Qiu saw him in November with increased leg swelling. He may have been eating some salty food and he has not been elevating his legs. When his daughter is away he often sits at a table. He has multiple pill bottles, some are duplicates. The daily pill containers seem to be organized. He is taking lasix 80 in am. Past hx of stasis ulcers and venous insufficiency.  Home health was ordered in November. We think it may have been [facility]?       Review of Systems   Constitutional: Positive for unexpected weight change.   Respiratory: Negative for shortness of breath.    Cardiovascular: Positive for leg swelling. Negative for chest pain.   Gastrointestinal: Negative for abdominal distention and abdominal pain.       Objective:     Blood pressure 130/74, pulse 68, temperature 99.2 °F (37.3 °C), height 5' 9" (1.753 m), weight 108.2 kg (238 lb 8.6 oz).      Physical Exam   Constitutional: He appears well-developed and well-nourished. No distress.   Cardiovascular: Normal rate, regular rhythm and normal heart sounds.    No murmur heard.  Pulmonary/Chest: Effort normal. No respiratory distress. He has rales (mild right posterior crackles).   Abdominal: Soft. He exhibits no distension.   Musculoskeletal: He exhibits edema (4+ with blisters and oozing. Nontender).       Assessment:       1. Stasis ulcer of left lower extremity    2. Peripheral venous insufficiency    3. Type 2 diabetes mellitus with diabetic polyneuropathy, without long-term current use of insulin    4. Essential hypertension        Plan:       Increase lasix to 160. Aim for daily wt reduction of 2#. Unna boots and Ace. [facility] home health to work on him with Unna boot changes 1-2 times a week. Try to elevate legs. Lab here today.      "

## 2018-02-23 ENCOUNTER — TELEPHONE (OUTPATIENT)
Dept: FAMILY MEDICINE | Facility: CLINIC | Age: 83
End: 2018-02-23

## 2018-02-23 NOTE — TELEPHONE ENCOUNTER
----- Message from Evin Blue sent at 2/23/2018 12:48 PM CST -----  Contact: [facility]  She's calling in regards to pt not available for admit until Monday 2/26/18, [facility] advise, [phone] (office)

## 2018-03-06 ENCOUNTER — TELEPHONE (OUTPATIENT)
Dept: FAMILY MEDICINE | Facility: CLINIC | Age: 83
End: 2018-03-06

## 2018-03-06 NOTE — TELEPHONE ENCOUNTER
----- Message from Anushka Venegas sent at 3/6/2018  3:47 PM CST -----  [facility] [phone] is calling to get the last office notes and a medication list for patient. Please fax to [phone].

## 2018-03-21 ENCOUNTER — PATIENT OUTREACH (OUTPATIENT)
Dept: ADMINISTRATIVE | Facility: HOSPITAL | Age: 83
End: 2018-03-21

## 2018-03-21 NOTE — LETTER
March 21, 2018    James MAYFIELD Jean-Pierre  [address] [facility] [address]             [facility]  [address]  [facility] [address]  Phone: [phone] Dear Mr. Morejon:    [facility] is committed to your overall health.  To help you get the most out of each of your visits, we will review your information to make sure you are up to date on all of your recommended tests and/or procedures.      We have Dr. Chidi Benavides listed as your primary care provider.  He has found that your chart shows you may be due for your fasting cholesterol labs, your annual diabetic eye and foot exams, and possibly tetanus and shingles immunizations.     If you have not had an eye exam in the past year, we now have a  Retinal Camera at the [facility].  If we do this exam the same day you see a member of your Primary Care team, we can save you from having to pay a second co pay.      Medicare does not cover all immunizations to be given in the clinic.  Check your benefits to ensure that you do not need to receive your immunizations at the pharmacy.    If you have had any of the above done at an outside facility, please let us know so I can update your record.  If you have a copy of these records, please provide a copy for us to scan into your chart.  If not, please provide that provider/facilities contact information so that we may obtain copies from that facility.     Otherwise, please schedule these appointments at your earliest convenience.  You are due for your diabetic follow up with Dr. Benavides in August of 2018.    If you have any questions or concerns, please don't hesitate to call.    Thank you for letting us care for you,  Rosaura Worley LPN Clinical Care Coordinator  [facility]  [phone]

## 2018-03-21 NOTE — PROGRESS NOTES
DM non [facility], due your fasting cholesterol labs, your annual diabetic eye and foot exams, and possibly tetanus and shingles immunizations    Last ov Kennedy 2/2018

## 2018-06-20 ENCOUNTER — LAB VISIT (OUTPATIENT)
Dept: LAB | Facility: HOSPITAL | Age: 83
End: 2018-06-20
Attending: FAMILY MEDICINE
Payer: MEDICARE

## 2018-06-20 ENCOUNTER — OFFICE VISIT (OUTPATIENT)
Dept: FAMILY MEDICINE | Facility: CLINIC | Age: 83
End: 2018-06-20
Payer: MEDICARE

## 2018-06-20 ENCOUNTER — TELEPHONE (OUTPATIENT)
Dept: FAMILY MEDICINE | Facility: CLINIC | Age: 83
End: 2018-06-20

## 2018-06-20 VITALS
DIASTOLIC BLOOD PRESSURE: 62 MMHG | TEMPERATURE: 99 F | SYSTOLIC BLOOD PRESSURE: 128 MMHG | OXYGEN SATURATION: 93 % | HEIGHT: 69 IN | BODY MASS INDEX: 34.04 KG/M2 | WEIGHT: 229.81 LBS | HEART RATE: 66 BPM

## 2018-06-20 DIAGNOSIS — R60.0 BILATERAL LEG EDEMA: ICD-10-CM

## 2018-06-20 DIAGNOSIS — I50.9 CONGESTIVE HEART FAILURE, UNSPECIFIED HF CHRONICITY, UNSPECIFIED HEART FAILURE TYPE: ICD-10-CM

## 2018-06-20 DIAGNOSIS — I83.029 STASIS ULCER OF LEFT LOWER EXTREMITY: ICD-10-CM

## 2018-06-20 DIAGNOSIS — L97.929 STASIS ULCER OF LEFT LOWER EXTREMITY: ICD-10-CM

## 2018-06-20 DIAGNOSIS — I73.9 PVD (PERIPHERAL VASCULAR DISEASE): ICD-10-CM

## 2018-06-20 DIAGNOSIS — B35.1 ONYCHOMYCOSIS OF TOENAIL: ICD-10-CM

## 2018-06-20 DIAGNOSIS — L97.911 NON-PRESSURE CHRONIC ULCER OF RIGHT LOWER LEG, LIMITED TO BREAKDOWN OF SKIN: ICD-10-CM

## 2018-06-20 DIAGNOSIS — E11.42 TYPE 2 DIABETES MELLITUS WITH DIABETIC POLYNEUROPATHY, WITHOUT LONG-TERM CURRENT USE OF INSULIN: ICD-10-CM

## 2018-06-20 DIAGNOSIS — I10 ESSENTIAL HYPERTENSION: ICD-10-CM

## 2018-06-20 DIAGNOSIS — E11.42 TYPE 2 DIABETES MELLITUS WITH DIABETIC POLYNEUROPATHY, WITHOUT LONG-TERM CURRENT USE OF INSULIN: Primary | ICD-10-CM

## 2018-06-20 LAB
ALBUMIN SERPL BCP-MCNC: 2.8 G/DL
ALP SERPL-CCNC: 60 U/L
ALT SERPL W/O P-5'-P-CCNC: 7 U/L
ANION GAP SERPL CALC-SCNC: 5 MMOL/L
AST SERPL-CCNC: 17 U/L
BILIRUB SERPL-MCNC: 1 MG/DL
BUN SERPL-MCNC: 19 MG/DL
CALCIUM SERPL-MCNC: 9.1 MG/DL
CHLORIDE SERPL-SCNC: 101 MMOL/L
CO2 SERPL-SCNC: 32 MMOL/L
CREAT SERPL-MCNC: 1.3 MG/DL
ERYTHROCYTE [DISTWIDTH] IN BLOOD BY AUTOMATED COUNT: 16.9 %
EST. GFR  (AFRICAN AMERICAN): 59.2 ML/MIN/1.73 M^2
EST. GFR  (NON AFRICAN AMERICAN): 51.2 ML/MIN/1.73 M^2
ESTIMATED AVG GLUCOSE: 123 MG/DL
GLUCOSE SERPL-MCNC: 78 MG/DL
HBA1C MFR BLD HPLC: 5.9 %
HCT VFR BLD AUTO: 34 %
HGB BLD-MCNC: 10.4 G/DL
MCH RBC QN AUTO: 26.1 PG
MCHC RBC AUTO-ENTMCNC: 30.6 G/DL
MCV RBC AUTO: 85 FL
PLATELET # BLD AUTO: 155 K/UL
PMV BLD AUTO: 10.7 FL
POTASSIUM SERPL-SCNC: 3.8 MMOL/L
PROT SERPL-MCNC: 8 G/DL
RBC # BLD AUTO: 3.99 M/UL
SODIUM SERPL-SCNC: 138 MMOL/L
WBC # BLD AUTO: 3.9 K/UL

## 2018-06-20 PROCEDURE — 11721 DEBRIDE NAIL 6 OR MORE: CPT | Mod: PBBFAC,PN | Performed by: FAMILY MEDICINE

## 2018-06-20 PROCEDURE — 11721 DEBRIDE NAIL 6 OR MORE: CPT | Mod: S$PBB,,, | Performed by: FAMILY MEDICINE

## 2018-06-20 PROCEDURE — 99213 OFFICE O/P EST LOW 20 MIN: CPT | Mod: PBBFAC,PN,25 | Performed by: FAMILY MEDICINE

## 2018-06-20 PROCEDURE — 99999 PR PBB SHADOW E&M-EST. PATIENT-LVL III: CPT | Mod: PBBFAC,,, | Performed by: FAMILY MEDICINE

## 2018-06-20 PROCEDURE — 85027 COMPLETE CBC AUTOMATED: CPT

## 2018-06-20 PROCEDURE — 83036 HEMOGLOBIN GLYCOSYLATED A1C: CPT

## 2018-06-20 PROCEDURE — 99214 OFFICE O/P EST MOD 30 MIN: CPT | Mod: S$PBB,25,, | Performed by: FAMILY MEDICINE

## 2018-06-20 PROCEDURE — 36415 COLL VENOUS BLD VENIPUNCTURE: CPT | Mod: PN

## 2018-06-20 PROCEDURE — 80053 COMPREHEN METABOLIC PANEL: CPT

## 2018-06-20 RX ORDER — FUROSEMIDE 80 MG/1
160 TABLET ORAL DAILY
Qty: 90 TABLET | Refills: 1 | Status: SHIPPED | OUTPATIENT
Start: 2018-06-20 | End: 2018-09-20

## 2018-06-20 NOTE — PROGRESS NOTES
"Subjective:       Patient ID: James Morejon is a 83 y.o. male.    Chief Complaint: Leg Swelling (bilateral leg edema/blisters)    He is here with his daughter.  I reviewed the note from February when he had a similar presentation with bilateral leg swelling and open ulcers.  He was treated with Unna boot placement.  There was follow-up treatment with [facility],  [facility] in [place], [place].  He now presents with a recurrence.  His Lasix was increased from 80 mg to 160 mg.  Neither he nor his daughter no the names of the pills in his pill box and they did not bring the prescription containers.  It is unclear what dose of Lasix he is really taking.  He denies chest pain or dyspnea.  He does have a past history of CHF.  His weight is actually a little bit less then in February.  Good appetite.  No trouble with the bowel movements.      Review of Systems   Constitutional: Negative for fever and unexpected weight change.   Respiratory: Negative for shortness of breath and wheezing.    Cardiovascular: Positive for leg swelling. Negative for chest pain and palpitations.   Gastrointestinal: Negative for abdominal pain, constipation and diarrhea.       Objective:     Blood pressure 128/62, pulse 66, temperature 98.8 °F (37.1 °C), temperature source Oral, height 5' 9" (1.753 m), weight 104.2 kg (229 lb 13.3 oz), SpO2 (!) 93 %.      Physical Exam   Constitutional: He appears well-developed and well-nourished. No distress.   Cardiovascular: Normal rate.    Frequent PVCs.  Mild JVD   Pulmonary/Chest: Effort normal and breath sounds normal. No respiratory distress.   Abdominal: Soft. Bowel sounds are normal. He exhibits no distension. There is no tenderness.   Musculoskeletal: He exhibits edema (4+ bilateral lower extremity edema with chronic pigment changes.  Open ulcers bilaterally but worse on the right.). He exhibits no tenderness.   Skin:   Bilateral hypertrophic nails with some down curving.  Some evidence of " onychomycosis.  Decreased sensation to monofilament.       Assessment:       1. Type 2 diabetes mellitus with diabetic polyneuropathy, without long-term current use of insulin    2. PVD (peripheral vascular disease)    3. Bilateral leg edema    4. Stasis ulcer of left lower extremity    5. 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 Congestive heart failure, unspecified HF chronicity, unspecified heart failure type    7. Essential hypertension        Plan:       I debrided 10 toenails that he could not provide care for himself.  New prescription for Lasix 80 mg to take 2 tablets in the morning.  A new boots placed today with underlying progran dressings    he will return to us next week for dressing change and then be cared for by home health in [facility]  lab work today

## 2018-06-20 NOTE — TELEPHONE ENCOUNTER
----- Message from Nalini Blakely sent at 6/20/2018  9:58 AM CDT -----  Contact: Patient's daughter, Danuta Morejon  Patient's daughter just wanted to let the office know that she received the message regarding patient's appointment for today and they will be there.  Call Back#[phone]  Thanks

## 2018-06-20 NOTE — TELEPHONE ENCOUNTER
----- Message from Yuliet Wooten sent at 6/20/2018  8:43 AM CDT -----  Contact: Danuta Morejon (Daughter)  Type:  Sooner Apoointment Request    Caller is requesting a sooner appointment.  Caller declined first available appointment listed below.  Caller will not accept being placed on the waitlist and is requesting a message be sent to doctor.    Name of Caller:  Danuta Morejon (Daughter)  When is the first available appointment? 6/28/18  Symptoms: both legs swollen  Best Call Back Number:   Additional Information:  The patient is scheduled for 6/28/18, but the daughter wants the Nurse to call her to see if she can get a sooner appt. Please advise.

## 2018-06-20 NOTE — TELEPHONE ENCOUNTER
LM for pt to call back, 2:15 available today with Dr. Benavides. Advised her to call back to confirm

## 2018-06-26 ENCOUNTER — CLINICAL SUPPORT (OUTPATIENT)
Dept: FAMILY MEDICINE | Facility: CLINIC | Age: 83
End: 2018-06-26
Payer: MEDICARE

## 2018-06-26 VITALS — WEIGHT: 223.5 LBS | BODY MASS INDEX: 33.01 KG/M2

## 2018-06-26 DIAGNOSIS — Z51.89 VISIT FOR WOUND CARE: Primary | ICD-10-CM

## 2018-06-26 PROCEDURE — 99211 OFF/OP EST MAY X REQ PHY/QHP: CPT | Mod: S$PBB,,, | Performed by: FAMILY MEDICINE

## 2018-06-26 PROCEDURE — 99211 OFF/OP EST MAY X REQ PHY/QHP: CPT | Mod: PBBFAC,PN

## 2018-06-26 PROCEDURE — 99999 PR PBB SHADOW E&M-EST. PATIENT-LVL I: CPT | Mod: PBBFAC,,,

## 2018-06-26 NOTE — PROGRESS NOTES
Pt here for dressing change to BLE. Weighed pt prior to dressing change. Pt down 6 lb. LLE scrubbed w/ wound cleansing spray and luke warm water. No signs of infection. Allowed to air dry then wrapped w/ Unna boot and 6 in Ace wrap x 2. RLE anterior wound 2.5cm x 2cm cleansed, leg scrubbed w/ wound cleansing spray and luke warm water. Posterior RLE w/ some skin breakdown, cleansed, as well. Anterior and posterior wounds dressed w/ Promegran, Adeptic and sterile 4x4. Leg wrapped w/ Unna boot and 6 in Ace wrap x 2. The patient is aware HH nurse will be out to see him for dressing change later this week.

## 2018-06-29 ENCOUNTER — TELEPHONE (OUTPATIENT)
Dept: FAMILY MEDICINE | Facility: CLINIC | Age: 83
End: 2018-06-29

## 2018-06-29 NOTE — TELEPHONE ENCOUNTER
HH order can not say\  Do not start service until after July 1st as they will be traveling  Medicare will deny services if that is on order

## 2018-06-29 NOTE — TELEPHONE ENCOUNTER
----- Message from Cirilo Suggs sent at 6/29/2018  3:27 PM CDT -----  Contact: Aida  Type: Needs Medical Advice    Who Called:  Aida with [facility]  Symptoms (please be specific):    How long has patient had these symptoms:    Pharmacy name and phone #:    Best Call Back Number:  [phone]  Additional Information: requesting a call back regarding new order for home health fax# [phone]

## 2018-07-02 NOTE — TELEPHONE ENCOUNTER
Spoke w/ Aida at [facility]. She states they will go see pt on 7/1 or 7/2, but we would have to fax new order on Monday AM. She faxed a written order for  to treat pt's wound care for Dr Benavides to sign, he has signed it and it has been refaxed.

## 2018-07-10 ENCOUNTER — TELEPHONE (OUTPATIENT)
Dept: FAMILY MEDICINE | Facility: CLINIC | Age: 83
End: 2018-07-10

## 2018-07-10 NOTE — TELEPHONE ENCOUNTER
----- Message from Rashmi Aguirre sent at 7/9/2018  1:55 PM CDT -----  Contact: Molly Barnes [phone] with [facility] in [place] is calling/have orders to put JACKSON boots on patient with Kerlix/but there is a lot of drainage - can she use ace wraps?/please advise

## 2018-08-07 ENCOUNTER — TELEPHONE (OUTPATIENT)
Dept: FAMILY MEDICINE | Facility: CLINIC | Age: 83
End: 2018-08-07

## 2018-08-07 NOTE — TELEPHONE ENCOUNTER
----- Message from Ginger Jain sent at 8/7/2018  2:57 PM CDT -----  Type: Needs appointment    Who Called:  Danuta (Daughter)  Best Call Back Number: [phone]  Additional Information: Stated that patient has ulcers on right leg/requesting appointment for Monday, August 13th in the morning/please call back to confirm.

## 2018-08-07 NOTE — TELEPHONE ENCOUNTER
Spoke with doni at   Informed her that Dr Benavides agrees with [facility]  Informed daughter that home health will use a alternate dressing and will let our office know how dressing is working  Pt daughter verbalized understanding

## 2018-08-07 NOTE — TELEPHONE ENCOUNTER
----- Message from Monserrat Christine sent at 8/7/2018  1:11 PM CDT -----  Contact: Nurse Molly from [facility] [phone]  Nurse Molly from [facility] called and asked for a call back she needs to discuss orders for Wound care for this patient. Call back at [phone]

## 2018-08-07 NOTE — TELEPHONE ENCOUNTER
Spoke with Molly the  Nurse and she stated the Wounds are getting a little bigger and draining more.   She is asking if she can switch from the progran dressing to the aquacel ag because it absorbs more.     Please advise       Order needs to be faxed to   [phone]

## 2018-08-21 ENCOUNTER — TELEPHONE (OUTPATIENT)
Dept: FAMILY MEDICINE | Facility: CLINIC | Age: 83
End: 2018-08-21

## 2018-08-21 NOTE — TELEPHONE ENCOUNTER
I agree with placing a 7 day dressing. The daughter can remove at 7 days. She can replace the ace as needed.

## 2018-08-21 NOTE — TELEPHONE ENCOUNTER
Tried to reach nurse. No answer. No voicemail.  If we reach her prior to pt leaving [facility], and if wounds are compatible, can we recommend that they are dressed with the silver dressing such as the Promegran Silver as this is a 7 day dressing? Apply Adeptic or nonstick adherent dressing, conforming gauze and ACE over the silver dressing?     #2 The ACE dressings would need changing prior to end of 7 days. Any suggestions? Should we instruct the daughter on any specific symptoms that would warrant her taking him to an ED in the area? Please advise.

## 2018-08-21 NOTE — TELEPHONE ENCOUNTER
----- Message from Abdi Umanzor sent at 8/20/2018 10:59 AM CDT -----  Type: Needs Medical Advice    Who Called:  [facility]    Best Call Back Number: (Nurse) [phone]   Additional Information:Nurse calling.  States that patient won't be able to make his HH appointments due to death in family.  Nurse states that the patient's dressings won't be able to be changed for over a week.

## 2018-08-22 NOTE — TELEPHONE ENCOUNTER
Tried to reach Danuta again. No answer. Voice mailbox is full, unable to leave msg.  Tried to reach  nurse again. No answer and no voicemail. Unable to leave msg.

## 2018-08-22 NOTE — TELEPHONE ENCOUNTER
----- Message from Crystal Kaminski sent at 8/22/2018  4:29 PM CDT -----     Molly  Is  Returning call  From  [facility] // please call//[phone]

## 2018-08-22 NOTE — TELEPHONE ENCOUNTER
Spoke w/ Molly. Dtr left msg over weekend that they were leaving to go out of town for her son's . She called pt Monday and they had left already and won't be back until next Tues. Will try to continue to reach Danuta to bring pt here for dressing/wound care. Pt's dressing was last changed on 18. Needs to be redressed ASAP

## 2018-08-22 NOTE — TELEPHONE ENCOUNTER
Tried to reach Sloan, pt's daughter to see if he will be in our area while away from home. If so, will have HH nurse bandage legs and make appt for pt to f/u with Dr Benavides or nurse for dressing change. Unable to leave voicemail.   Tried to reach HH nurse again. No answer and no voicemail. Unable to leave msg

## 2018-08-27 ENCOUNTER — TELEPHONE (OUTPATIENT)
Dept: FAMILY MEDICINE | Facility: CLINIC | Age: 83
End: 2018-08-27

## 2018-08-27 NOTE — TELEPHONE ENCOUNTER
[facility] has done wound care, wound has odor and wound has increased. Patient has not had wound care performed in  Days as he has been out of town to prepare for a . Patient had 5 lb weight gain 226 lb today, increased leg size, though no pitting edema noted. Ma[facility] has requested that patient can possibly have wound care done in [place] temporarily as he is going back to be able to attend .

## 2018-08-27 NOTE — TELEPHONE ENCOUNTER
Still unable to reach pt's daughter and unable to leave a message. Will close encounter and address/advise if she calls back. Any other recommendation?

## 2018-08-27 NOTE — TELEPHONE ENCOUNTER
----- Message from Ginger Pedroza sent at 8/27/2018  1:12 PM CDT -----  Type: Needs Medical Advice    Who Called: Maite with [facility]  Best Call Back Number:[phone]  Additional Information: contact Maite regarding patients wound, has weird order,       Thank you

## 2018-08-28 NOTE — TELEPHONE ENCOUNTER
Increase lasix to 240 mg in the am. (3 of the 80 mg tablets).  I am fine with him getting wound care in [place].  Can you arrange that?

## 2018-08-30 NOTE — TELEPHONE ENCOUNTER
Spoke with pt's home health nurse Maite in regards to pt wound. I attempted to contact [facility] (phone [phone] fax) for pt to be able to schedule with them while in [place]. Left message with wound care and advised them to either call our office or home health to inform whether they can see pt. Once wound care appt is scheduled Dr Benavides will place order to be faxed.

## 2018-08-30 NOTE — TELEPHONE ENCOUNTER
----- Message from Alayna Villanueva sent at 8/29/2018  2:36 PM CDT -----  Contact: Maite with [facility]  Maite with [facility] calling regarding the wound care appointment in [place]. Maite needs to know if a appointment was made. please call Maite at [phone]. Thanks!

## 2018-09-06 ENCOUNTER — PATIENT OUTREACH (OUTPATIENT)
Dept: ADMINISTRATIVE | Facility: HOSPITAL | Age: 83
End: 2018-09-06

## 2018-09-06 NOTE — LETTER
September 6, 2018    James MAYFIELD Jean-Pierre  [address]  [place] [address]             [facility]  [address]  [facility] [address]  Phone: [phone] Dear Mr. Morejon:    [facility] is committed to your overall health.  To help you get the most out of each of your visits, we will review your information to make sure you are up to date on all of your recommended tests and/or procedures.      Dr. Benavides has found that your chart shows you may be due for annual diabetic foot and eye exam, fasting cholesterol labs, tetanus, shingles and flu vaccine, urine microalbumin.     If you have had any of the above done at another facility, please bring the records or information with you so that your record at [facility] will be complete.  If you would like to schedule any of these, please contact me.    If you are currently taking medication, please bring it with you to your appointment for review.    Also, if you have any type of Advanced Directives, please bring them with you to your office visit so we may scan them into your chart.        If you have any questions or concerns, please don't hesitate to call.    Sincerely,        Jose Enrique Galindo LPN Clinical Care Coordinator  [facility] [address] [facility] [address]  [place] [address]  [phone] (p)   [phone] (f)

## 2018-09-20 ENCOUNTER — LAB VISIT (OUTPATIENT)
Dept: LAB | Facility: HOSPITAL | Age: 83
End: 2018-09-20
Attending: FAMILY MEDICINE
Payer: MEDICARE

## 2018-09-20 ENCOUNTER — OFFICE VISIT (OUTPATIENT)
Dept: FAMILY MEDICINE | Facility: CLINIC | Age: 83
End: 2018-09-20
Payer: MEDICARE

## 2018-09-20 VITALS
WEIGHT: 219.13 LBS | SYSTOLIC BLOOD PRESSURE: 144 MMHG | BODY MASS INDEX: 32.36 KG/M2 | DIASTOLIC BLOOD PRESSURE: 76 MMHG

## 2018-09-20 DIAGNOSIS — E11.42 TYPE 2 DIABETES MELLITUS WITH DIABETIC POLYNEUROPATHY, WITHOUT LONG-TERM CURRENT USE OF INSULIN: ICD-10-CM

## 2018-09-20 DIAGNOSIS — I10 ESSENTIAL HYPERTENSION: Primary | ICD-10-CM

## 2018-09-20 DIAGNOSIS — I83.029 STASIS ULCER OF LEFT LOWER EXTREMITY: ICD-10-CM

## 2018-09-20 DIAGNOSIS — I25.10 CORONARY ARTERY DISEASE INVOLVING NATIVE CORONARY ARTERY WITHOUT ANGINA PECTORIS, UNSPECIFIED WHETHER NATIVE OR TRANSPLANTED HEART: ICD-10-CM

## 2018-09-20 DIAGNOSIS — L97.929 STASIS ULCER OF LEFT LOWER EXTREMITY: ICD-10-CM

## 2018-09-20 DIAGNOSIS — I87.2 PERIPHERAL VENOUS INSUFFICIENCY: ICD-10-CM

## 2018-09-20 LAB
ESTIMATED AVG GLUCOSE: 120 MG/DL
HBA1C MFR BLD HPLC: 5.8 %

## 2018-09-20 PROCEDURE — 36415 COLL VENOUS BLD VENIPUNCTURE: CPT | Mod: PN

## 2018-09-20 PROCEDURE — 99214 OFFICE O/P EST MOD 30 MIN: CPT | Mod: S$PBB,,, | Performed by: FAMILY MEDICINE

## 2018-09-20 PROCEDURE — 83036 HEMOGLOBIN GLYCOSYLATED A1C: CPT

## 2018-09-20 PROCEDURE — 99213 OFFICE O/P EST LOW 20 MIN: CPT | Mod: PBBFAC,PN | Performed by: FAMILY MEDICINE

## 2018-09-20 PROCEDURE — 99999 PR PBB SHADOW E&M-EST. PATIENT-LVL III: CPT | Mod: PBBFAC,,, | Performed by: FAMILY MEDICINE

## 2018-09-20 RX ORDER — CLOPIDOGREL BISULFATE 75 MG/1
75 TABLET ORAL DAILY
Qty: 90 TABLET | Refills: 3 | Status: SHIPPED | OUTPATIENT
Start: 2018-09-20 | End: 2018-10-12 | Stop reason: SDUPTHER

## 2018-09-20 RX ORDER — METOPROLOL TARTRATE 50 MG/1
50 TABLET ORAL 2 TIMES DAILY
Qty: 180 TABLET | Refills: 3 | Status: SHIPPED | OUTPATIENT
Start: 2018-09-20 | End: 2018-10-12 | Stop reason: SDUPTHER

## 2018-09-20 RX ORDER — FUROSEMIDE 80 MG/1
80 TABLET ORAL DAILY
Qty: 90 TABLET | Refills: 3 | Status: SHIPPED | OUTPATIENT
Start: 2018-09-20 | End: 2018-10-12 | Stop reason: SDUPTHER

## 2018-09-20 RX ORDER — FINASTERIDE 5 MG/1
5 TABLET, FILM COATED ORAL DAILY
Qty: 90 TABLET | Refills: 3 | Status: SHIPPED | OUTPATIENT
Start: 2018-09-20 | End: 2018-10-12 | Stop reason: SDUPTHER

## 2018-09-20 RX ORDER — PRAVASTATIN SODIUM 40 MG/1
40 TABLET ORAL DAILY
Qty: 90 TABLET | Refills: 3 | Status: SHIPPED | OUTPATIENT
Start: 2018-09-20 | End: 2019-10-14 | Stop reason: SDUPTHER

## 2018-09-20 NOTE — PATIENT INSTRUCTIONS
Decrease furosemide to 80 mg daily. Watch your weight and leg swelling. If you are retaining more fluid, then change furosemide to 160 mg.

## 2018-09-20 NOTE — PROGRESS NOTES
Subjective:       Patient ID: James Morejon is a 83 y.o. male.    Chief Complaint:  Follow-up leg ulcers  He is here with his daughter.  Follow-up chronic venous insufficiency with leg ulcers.  He has been managed by home health.  He feels fine.  No fever.  He has a history of coronary disease hypertension and diabetes.  Those have been fairly well controlled.  I reviewed his medications.  I think he has been taking Lasix 160 mg daily.  His weight is down.  I refilled all of his medications to go to a pharmacy in [place].      Review of Systems   Constitutional: Positive for unexpected weight change. Negative for appetite change, fatigue and fever.   Respiratory: Negative for shortness of breath.    Cardiovascular: Positive for leg swelling. Negative for chest pain and palpitations.   Gastrointestinal: Negative for abdominal distention and abdominal pain.       Objective:     Blood pressure (!) 144/76, weight 99.4 kg (219 lb 2.2 oz).      Physical Exam   Constitutional: He appears well-developed.   Overweight and in no distress.   Cardiovascular: Normal rate and regular rhythm.   Pulmonary/Chest: Effort normal and breath sounds normal. No respiratory distress. He has no rales.   Musculoskeletal: He exhibits edema (2+ nontender lower leg edema.).   Neurological: He is alert.   Skin:   Scaling and hyperpigmentation to both lower legs.  1.5 cm ulcer on the right lateral leg.  It does not look infected.       Assessment:       1. Essential hypertension    2. Stasis ulcer of left lower extremity    3. Peripheral venous insufficiency    4. Type 2 diabetes mellitus with diabetic polyneuropathy, without long-term current use of insulin    5. Coronary artery disease involving native coronary artery without angina pectoris, unspecified whether native or transplanted heart        Plan:       An antibacterial dressing was placed on the right leg ulcer.  Both legs were wrapped with Unna boots.  Lab work drawn today.  Reduce  furosemide to 80 mg.  Home health can help monitor his fluid situation and adjust the Lasix appropriately.

## 2018-10-12 RX ORDER — POTASSIUM CHLORIDE 750 MG/1
10 CAPSULE, EXTENDED RELEASE ORAL DAILY
Qty: 90 CAPSULE | Refills: 1 | Status: SHIPPED | OUTPATIENT
Start: 2018-10-12 | End: 2019-08-29 | Stop reason: SDUPTHER

## 2018-10-12 RX ORDER — CILOSTAZOL 100 MG/1
100 TABLET ORAL 2 TIMES DAILY
Qty: 180 TABLET | Refills: 1 | Status: SHIPPED | OUTPATIENT
Start: 2018-10-12 | End: 2019-03-18

## 2018-10-12 RX ORDER — FUROSEMIDE 80 MG/1
80 TABLET ORAL DAILY
Qty: 90 TABLET | Refills: 3 | Status: SHIPPED | OUTPATIENT
Start: 2018-10-12 | End: 2019-03-18

## 2018-10-12 RX ORDER — FINASTERIDE 5 MG/1
5 TABLET, FILM COATED ORAL DAILY
Qty: 90 TABLET | Refills: 3 | Status: SHIPPED | OUTPATIENT
Start: 2018-10-12 | End: 2019-10-12

## 2018-10-12 RX ORDER — METOPROLOL TARTRATE 50 MG/1
50 TABLET ORAL 2 TIMES DAILY
Qty: 180 TABLET | Refills: 3 | Status: SHIPPED | OUTPATIENT
Start: 2018-10-12 | End: 2019-10-12

## 2018-10-12 RX ORDER — CLOPIDOGREL BISULFATE 75 MG/1
75 TABLET ORAL DAILY
Qty: 90 TABLET | Refills: 3 | Status: SHIPPED | OUTPATIENT
Start: 2018-10-12 | End: 2020-05-21 | Stop reason: SDUPTHER

## 2018-10-12 RX ORDER — TAMSULOSIN HYDROCHLORIDE 0.4 MG/1
0.8 CAPSULE ORAL
Qty: 180 CAPSULE | Refills: 1 | Status: SHIPPED | OUTPATIENT
Start: 2018-10-12 | End: 2019-08-12 | Stop reason: SDUPTHER

## 2018-10-12 NOTE — TELEPHONE ENCOUNTER
----- Message from Ginger Pedroza sent at 10/12/2018  8:02 AM CDT -----  Type: Needs Medical Advice    Who Called:  Danuta Morejon - daughter  Symptoms (please be specific):  n/a  How long has patient had these symptoms:  n/a  Pharmacy name and phone #:   [facility] - [place], [place] - [address]  [address]  [place] [address]  Phone: [phone] Fax: [phone]  Best Call Back Number: [phone]  Additional Information: caller states he needs to have all of he prescriptions sent to a different pharmacy due to he is out of all medication and had not received his medicare card yet, she would not give detail on medication, requesting a return call.    Thank you

## 2018-11-08 ENCOUNTER — TELEPHONE (OUTPATIENT)
Dept: FAMILY MEDICINE | Facility: CLINIC | Age: 83
End: 2018-11-08

## 2018-11-08 NOTE — TELEPHONE ENCOUNTER
----- Message from Kristin Lance sent at 11/8/2018  9:46 AM CST -----  Type: Needs Medical Advice    [facility]    Best Call Back Number: [phone]  Additional Information: Currently at the patient's home and wanted to discuss some information

## 2018-11-08 NOTE — TELEPHONE ENCOUNTER
----- Message from Ginger Jain sent at 11/8/2018  9:52 AM CST -----  Type:   Returning Call    Who Called: Daughter (Danuta)  Who Left Message for Patient: Daphney  Does the patient know what this is regarding?:  Patient  Best Call Back Number:  [phone]  Additional Information:  Stated very important

## 2018-11-08 NOTE — TELEPHONE ENCOUNTER
Spoke with Molly  wound care nurse. She states pt is leaving today for a month to go out of state and she applied a new js boot today. Wound is almost closed, just a small less than an inch opening. She is asking what wound care to instruct pt's daughter on as they do not have js boots to bring with them on the trip. Please advise.

## 2018-11-08 NOTE — TELEPHONE ENCOUNTER
----- Message from Ryan Maciel sent at 11/8/2018  9:13 AM CST -----  Contact: Molly with [facility]   Molly with [facility] need to speak with a nurse regarding patient wound care. Patient is going out of town and does not have 30 days worth of supplies. Please call back at [phone] or [phone]

## 2018-11-08 NOTE — TELEPHONE ENCOUNTER
Spoke with Molly, she is on the way to pt's house and was told by Danuta, his daughter that they are going out of town for a month. Molly is concerned about pt's wound care as daughter will not have supplies to change dressing.   LM for pt's daughter to call back to find out if they will be coming to [place] for the holiday, if so we can dress in clinic.

## 2018-11-20 ENCOUNTER — TELEPHONE (OUTPATIENT)
Dept: FAMILY MEDICINE | Facility: CLINIC | Age: 83
End: 2018-11-20

## 2018-11-20 NOTE — TELEPHONE ENCOUNTER
----- Message from Martha Vance sent at 11/20/2018 10:05 AM CST -----  Contact: Danuta Morejon (Daughter)  Danuta Morejon (Daughter) calling would like to make the pt an appointment but would like to speak to the nurse first concerning pt leg....[phone] before 12:00

## 2018-11-27 ENCOUNTER — TELEPHONE (OUTPATIENT)
Dept: FAMILY MEDICINE | Facility: CLINIC | Age: 83
End: 2018-11-27

## 2018-11-27 NOTE — TELEPHONE ENCOUNTER
Spoke w/ Danuta. She is bringing pt here tomorrow for dressing change. She is current;y wrapping his legs w/ Ace wraps as directed on 11/8/18 by Dr Benavides. She states she is changing these twice a wk. She is wanting to bring him in late in the day tomorrow. Advised her that he would need to be here no later than 2pm as Dr Benavides is going to have to take a look at them before we dress them. She agreed and will have him here by 2pm.

## 2018-11-27 NOTE — TELEPHONE ENCOUNTER
----- Message from Ginger Pedroza sent at 11/27/2018  8:17 AM CST -----  Type: Needs Medical Advice    Who Called:  Danuta Morejon - daughter  Best Call Back Number: [phone]  Additional Information: caller is requesting to reschedule patients nurse visit to a later time tomorrow, contact to advise    Thank you

## 2018-11-28 ENCOUNTER — CLINICAL SUPPORT (OUTPATIENT)
Dept: FAMILY MEDICINE | Facility: CLINIC | Age: 83
End: 2018-11-28
Payer: MEDICARE

## 2018-11-28 DIAGNOSIS — Z48.00 CHANGE OF DRESSING: Primary | ICD-10-CM

## 2018-11-28 PROCEDURE — 99211 OFF/OP EST MAY X REQ PHY/QHP: CPT | Mod: S$PBB,,, | Performed by: FAMILY MEDICINE

## 2018-12-04 NOTE — PROGRESS NOTES
Pt in for dressing change. Has ACE wraps to BLE applied by family member. Dressings removed. Dr Benavides came and evaluated pt's legs. Legs and feet cleaned thoroughly with warm water and SAF-Clens AF wound . Pt has 1 cm ulceration to out RLE. Applied triple antibiotic ointment and a nonstick bandage per Dr Benavides. Applied Unna boots to BLE and covered with ACE wraps. Pt tolerated well. Advised pt and daughter that HH will come out and change dressing in a few days. They verbalized understanding.

## 2018-12-06 ENCOUNTER — TELEPHONE (OUTPATIENT)
Dept: FAMILY MEDICINE | Facility: CLINIC | Age: 83
End: 2018-12-06

## 2018-12-06 NOTE — TELEPHONE ENCOUNTER
----- Message from Martha Vance sent at 12/6/2018  3:15 PM CST -----  Contact: Danuta Morejon (Daughter)  Danuta Morejon (Daughter) calling wants to know if Judie can wrap the pt leg Monday or Tuesday please....[phone]

## 2018-12-07 NOTE — TELEPHONE ENCOUNTER
----- Message from Cirilo Suggs sent at 12/7/2018  8:21 AM CST -----  Contact: Danuta  Type: Needs Medical Advice    Who Called:  Patient's daughter Danuta  Symptoms (please be specific):    How long has patient had these symptoms:    Pharmacy name and phone #:    Best Call Back Number: [phone]  Additional Information: requesting nurse appointment for leg dressing would like appointment for this Monday or Tuesday. Requesting a call back before noon

## 2018-12-10 ENCOUNTER — CLINICAL SUPPORT (OUTPATIENT)
Dept: FAMILY MEDICINE | Facility: CLINIC | Age: 83
End: 2018-12-10
Payer: MEDICARE

## 2018-12-10 DIAGNOSIS — Z48.00 DRESSING CHANGE: Primary | ICD-10-CM

## 2018-12-10 PROCEDURE — 99211 OFF/OP EST MAY X REQ PHY/QHP: CPT | Mod: S$PBB,,, | Performed by: FAMILY MEDICINE

## 2018-12-11 NOTE — PROGRESS NOTES
Pt here for dressing change to BLE. Previous dressings removed. Legs cleaned with wound/skin cleanser and warm water. Dr Benavides evaluated legs and recommended dressing with unna boots and ace wrap to both legs and no need for any specific wound care to previous sores. Both legs dressed as recommended. Pt tolerated well. Pt's daughter aware we will contact home health and have them go out to assess and redress on Monday 12/17/18.

## 2018-12-17 ENCOUNTER — TELEPHONE (OUTPATIENT)
Dept: FAMILY MEDICINE | Facility: CLINIC | Age: 83
End: 2018-12-17

## 2018-12-17 NOTE — TELEPHONE ENCOUNTER
----- Message from Dianaamada Raheem sent at 12/17/2018 11:43 AM CST -----  Contact: Maite a home health nurse   Maite a home health nurse, Need to speak with a nurse regarding patient wound. Please call back at [phone].

## 2018-12-17 NOTE — TELEPHONE ENCOUNTER
Spoke w/ Maite.  She states wound to RLE healed. Pt now with 2cm x 3cm clear serous fluid filled blister, L lateral ankle. No sign of infection. LLE pos for edema. LLE circumference at ankle area is 33cm, RLE is 30cm. She states pt's wt on 11/8/18 was 227lb and 233 today. She cleaned both LE's thoroughly, including between toes and dressed them with Unna boot and Ace wrap. She states since blister is not an open wound, she doesn't need to change this dressing for 7d. Please review and advise if you would like her to re-eval sooner than 7d or have any other recommendations.

## 2018-12-28 ENCOUNTER — TELEPHONE (OUTPATIENT)
Dept: FAMILY MEDICINE | Facility: CLINIC | Age: 83
End: 2018-12-28

## 2018-12-28 NOTE — TELEPHONE ENCOUNTER
----- Message from Satnam Rodriguez sent at 12/28/2018  4:36 PM CST -----  Contact: Nurse Arora with [facility]  Returning call to Prabha  Please call [phone] (C)

## 2018-12-28 NOTE — TELEPHONE ENCOUNTER
I have attempted without success to contact this nurse by phone to return their call and will try again later.

## 2018-12-28 NOTE — TELEPHONE ENCOUNTER
----- Message from Jose Almendarez sent at 12/28/2018  2:37 PM CST -----  Contact: Maite/[facility]  Maite called in regarding the attached patient.  Maite stated patient has new wound and needs new would care orders.  Wound is located on left lower extremity.  Maite's call back number is [phone]

## 2019-01-30 ENCOUNTER — TELEPHONE (OUTPATIENT)
Dept: FAMILY MEDICINE | Facility: CLINIC | Age: 84
End: 2019-01-30

## 2019-01-30 NOTE — TELEPHONE ENCOUNTER
----- Message from Rashmi Reddy sent at 1/30/2019  7:24 AM CST -----  Contact: Danuta  Type:  RX Refill Request    Who Called:  daughter  Refill or New Rx:  refill  RX Name and Strength:  All medications   How is the patient currently taking it? (ex. 1XDay):  See medication list  Is this a 30 day or 90 day RX:  30  Preferred Pharmacy with phone number:    [facility] - [place] - [address]  [address]  [place] [address]  Phone: [phone] Fax: [phone]  Local or Mail Order:  local  Ordering Provider:  Dr Chidi Benavides  Best Call Back Number:  [phone]  Additional Information:   States left home and too far away to turn around as can not find medications and will not be back at home until at least Saturday due to daughter having surgery in [place]. Thanks!

## 2019-01-30 NOTE — TELEPHONE ENCOUNTER
----- Message from Martha Vance sent at 1/30/2019 12:31 PM CST -----  Contact: daughter-Danuta Morejon  Pt daughter-Danuta Morejon states were able to get pt medication,thanks allot and Happy Birthday Hung and Nhi...[phone]

## 2019-01-30 NOTE — TELEPHONE ENCOUNTER
Spoke w/ pt's daughter. Pt is on way here from [place] and forgot his medications at home. Advised her to contact the pharmacy to get an updated list of what he is actually taking and call us back. Once we have this info, we will ask Dr Benavides to send temp rx's to [facility], [place] so pt is not without while in town.

## 2019-03-04 ENCOUNTER — TELEPHONE (OUTPATIENT)
Dept: FAMILY MEDICINE | Facility: CLINIC | Age: 84
End: 2019-03-04

## 2019-03-04 NOTE — TELEPHONE ENCOUNTER
----- Message from Clarisse Slade RT sent at 3/4/2019  7:29 AM CST -----  Contact: Danuta,Daughter, [phone]   Danuta,Daughter, [phone] , would like a call back soon to reinstate the pt's home health services for his leg issues, thanks.

## 2019-03-18 ENCOUNTER — OFFICE VISIT (OUTPATIENT)
Dept: FAMILY MEDICINE | Facility: CLINIC | Age: 84
End: 2019-03-18
Payer: MEDICARE

## 2019-03-18 VITALS
BODY MASS INDEX: 33.84 KG/M2 | WEIGHT: 229.19 LBS | DIASTOLIC BLOOD PRESSURE: 82 MMHG | SYSTOLIC BLOOD PRESSURE: 142 MMHG | TEMPERATURE: 98 F

## 2019-03-18 DIAGNOSIS — R60.0 BILATERAL LEG EDEMA: ICD-10-CM

## 2019-03-18 DIAGNOSIS — E11.42 TYPE 2 DIABETES MELLITUS WITH DIABETIC POLYNEUROPATHY, WITHOUT LONG-TERM CURRENT USE OF INSULIN: ICD-10-CM

## 2019-03-18 DIAGNOSIS — I87.2 PERIPHERAL VENOUS INSUFFICIENCY: ICD-10-CM

## 2019-03-18 DIAGNOSIS — I10 ESSENTIAL HYPERTENSION: ICD-10-CM

## 2019-03-18 DIAGNOSIS — I50.9 CONGESTIVE HEART FAILURE, UNSPECIFIED HF CHRONICITY, UNSPECIFIED HEART FAILURE TYPE: Primary | ICD-10-CM

## 2019-03-18 DIAGNOSIS — L97.911 NON-PRESSURE CHRONIC ULCER OF RIGHT LOWER LEG, LIMITED TO BREAKDOWN OF SKIN: ICD-10-CM

## 2019-03-18 PROCEDURE — 29580 STRAPPING UNNA BOOT: CPT | Mod: PBBFAC,PN | Performed by: FAMILY MEDICINE

## 2019-03-18 PROCEDURE — 29580 PR STRAPPING UNNA BOOT: ICD-10-PCS | Mod: S$PBB,RT,, | Performed by: FAMILY MEDICINE

## 2019-03-18 PROCEDURE — 99214 PR OFFICE/OUTPT VISIT, EST, LEVL IV, 30-39 MIN: ICD-10-PCS | Mod: 25,S$PBB,, | Performed by: FAMILY MEDICINE

## 2019-03-18 PROCEDURE — 99999 PR PBB SHADOW E&M-EST. PATIENT-LVL III: ICD-10-PCS | Mod: PBBFAC,,, | Performed by: FAMILY MEDICINE

## 2019-03-18 PROCEDURE — 29580 STRAPPING UNNA BOOT: CPT | Mod: S$PBB,RT,, | Performed by: FAMILY MEDICINE

## 2019-03-18 PROCEDURE — 99499 RISK ADDL DX/OHS AUDIT: ICD-10-PCS | Mod: S$PBB,,, | Performed by: FAMILY MEDICINE

## 2019-03-18 PROCEDURE — 99213 OFFICE O/P EST LOW 20 MIN: CPT | Mod: PBBFAC,PN | Performed by: FAMILY MEDICINE

## 2019-03-18 PROCEDURE — 99214 OFFICE O/P EST MOD 30 MIN: CPT | Mod: 25,S$PBB,, | Performed by: FAMILY MEDICINE

## 2019-03-18 PROCEDURE — 99499 UNLISTED E&M SERVICE: CPT | Mod: S$PBB,,, | Performed by: FAMILY MEDICINE

## 2019-03-18 PROCEDURE — 99999 PR PBB SHADOW E&M-EST. PATIENT-LVL III: CPT | Mod: PBBFAC,,, | Performed by: FAMILY MEDICINE

## 2019-03-18 RX ORDER — FUROSEMIDE 80 MG/1
160 TABLET ORAL DAILY
Qty: 180 TABLET | Refills: 3 | Status: SHIPPED | OUTPATIENT
Start: 2019-03-18 | End: 2019-06-24 | Stop reason: SDUPTHER

## 2019-03-18 NOTE — PROGRESS NOTES
"Subjective:       Patient ID: James Morejon is a 84 y.o. male.    Chief Complaint: Edema (BLE edema. pt states blisters "popped")    He is here with his daughter.  He has a recurrence of his right leg swelling with fluid leaking and skin breakdown.  There is also some pain. No fever.  No dyspnea or chest pain. He has had home health before.  His daughter travels a lot and he is not able to drive.  They do not have a good resource in their area for wound care.  He brought in a bag of bottles.  Half of them were empty and duplicate.  I reconciled his medication list.  He has been taking 80 mg of Lasix.      Review of Systems   Constitutional: Positive for unexpected weight change (Probably 10 lb of fluid weight increase.). Negative for appetite change and fever.   Respiratory: Negative for cough, shortness of breath and wheezing.    Cardiovascular: Positive for leg swelling. Negative for chest pain.       Objective:     Blood pressure (!) 142/82, temperature 98.4 °F (36.9 °C), temperature source Oral, weight 103.9 kg (229 lb 2.7 oz).      Physical Exam   Constitutional:   He is overweight and in no distress.  He is a very poor historian.   Cardiovascular: Normal rate and regular rhythm.   No murmur heard.  Pulmonary/Chest: Effort normal and breath sounds normal.   Abdominal: Soft. He exhibits distension.   Musculoskeletal: He exhibits edema.   4+ right leg edema. 3+ left leg edema.  Right lower leg is tender.  There are some superficial ulcerations as well as a few blisters.  No obvious cellulitis.   Neurological:   He is very vague in answering questions and I think has memory impairment.   Skin:   Avulsed right 5th toenail.  I removed it the rest of the way and placed a Band-Aid.       Assessment:       1. Congestive heart failure, unspecified HF chronicity, unspecified heart failure type    2. Bilateral leg edema    3. 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 Peripheral " venous insufficiency    5. Essential hypertension    6. Type 2 diabetes mellitus with diabetic polyneuropathy, without long-term current use of insulin        Plan:       Increase Lasix to 160 mg.  Home health to draw a BMP every week.  Unna placement 1 to 2 times a week on the right leg.  Ace wrap on the left.

## 2019-03-25 ENCOUNTER — TELEPHONE (OUTPATIENT)
Dept: FAMILY MEDICINE | Facility: CLINIC | Age: 84
End: 2019-03-25

## 2019-03-25 NOTE — TELEPHONE ENCOUNTER
----- Message from Yuliet Wooten sent at 3/25/2019  8:07 AM CDT -----  Contact: Danuta Morejon (Daughter)  Type: Needs Medical Advice    Who Called:  Danuta Morejon (Daughter)  Best Call Back Number: Danuta at   Additional Information: Calling to speak with the Nurse in regards to Home Health, [facility],  . She is requesting orders to be sent over to the Home Health to start the patient services. Danuta wants the Nurse to come to and do the wound care and wrapping of the patient's leg at   [address] [place] [phone]  Please advise.

## 2019-03-25 NOTE — TELEPHONE ENCOUNTER
Notified  Danuta that we will work with [facility], though Dr. Benavides not in office today. Understanding verbalized.

## 2019-03-26 ENCOUNTER — TELEPHONE (OUTPATIENT)
Dept: FAMILY MEDICINE | Facility: CLINIC | Age: 84
End: 2019-03-26

## 2019-03-26 NOTE — TELEPHONE ENCOUNTER
Spoke w/ pt's dtr. Advised her that I just faxed the referral to [facility]/ [facility] at the number provided. If she has not heard from them within the next 24hrs, she is going to contact them.

## 2019-03-26 NOTE — TELEPHONE ENCOUNTER
I had placed orders to [facility] on 3-18-19.  If they need additional orders, let me know. Maybe we need to fax the previous orders to them

## 2019-03-26 NOTE — TELEPHONE ENCOUNTER
----- Message from Martha Vance sent at 3/26/2019 10:00 AM CDT -----  Contact: [facility] states she got a referral from the office on this pt ,she is need his demographic and the pt lives in [place] so she is wanting to verify,please.Her Fax#[phone] and the phone # where she can be reached [phone].

## 2019-03-26 NOTE — TELEPHONE ENCOUNTER
----- Message from Annette Montano sent at 3/26/2019  8:48 AM CDT -----  Contact: Danuta daughter  Type: Needs Medical Advice    Who Called:  Danuta  Best Call Back Number: [phone]  Additional Information: Pls call daughter regarding set up for wound care. [facility] is what he will be using, [facility] is not the correct agency. Correct fax #[phone]. [facility]/ [facility] is available to speak [phone] if needed. They are needing orders. Pls call to adv

## 2019-04-04 ENCOUNTER — TELEPHONE (OUTPATIENT)
Dept: FAMILY MEDICINE | Facility: CLINIC | Age: 84
End: 2019-04-04

## 2019-04-04 NOTE — TELEPHONE ENCOUNTER
Spoke w/ Mega. She just wants to check on the poss need for glucometeras pt told her Dr Benavides said he didn't have to check his sugars. Advised her that pt probably wouldn't keep up with this, but if he or the daughter are willing and they would like a meter, we would ask Dr Benavides to rx one. She is going to educate and discuss this w/ pt and dtr and let us know if they would like a meter.

## 2019-04-04 NOTE — TELEPHONE ENCOUNTER
----- Message from Martha Vance sent at 4/4/2019 11:24 AM CDT -----  Contact: [facility] wants to see if pt need to check his CDG and if so new meter as well as a script....[phone]

## 2019-04-18 ENCOUNTER — TELEPHONE (OUTPATIENT)
Dept: FAMILY MEDICINE | Facility: CLINIC | Age: 84
End: 2019-04-18

## 2019-04-18 NOTE — TELEPHONE ENCOUNTER
----- Message from Ryan Maciel sent at 4/18/2019  1:15 PM CDT -----  Contact: Maite Sousa with [facility]   Maite Sousa with [facility] called, Pt wounds are healed and she want to know if she can change the wound care orders, please call back at [phone]

## 2019-05-20 ENCOUNTER — HOSPITAL ENCOUNTER (OUTPATIENT)
Dept: RADIOLOGY | Facility: HOSPITAL | Age: 84
Discharge: HOME | End: 2019-05-20
Attending: FAMILY MEDICINE
Payer: MEDICARE

## 2019-05-20 ENCOUNTER — TELEPHONE (OUTPATIENT)
Dept: FAMILY MEDICINE | Facility: CLINIC | Age: 84
End: 2019-05-20

## 2019-05-20 ENCOUNTER — OFFICE VISIT (OUTPATIENT)
Dept: FAMILY MEDICINE | Facility: CLINIC | Age: 84
End: 2019-05-20
Payer: MEDICARE

## 2019-05-20 VITALS
HEIGHT: 69 IN | HEART RATE: 68 BPM | SYSTOLIC BLOOD PRESSURE: 150 MMHG | BODY MASS INDEX: 32.67 KG/M2 | WEIGHT: 220.56 LBS | RESPIRATION RATE: 18 BRPM | DIASTOLIC BLOOD PRESSURE: 72 MMHG

## 2019-05-20 DIAGNOSIS — L97.911 NON-PRESSURE CHRONIC ULCER OF RIGHT LOWER LEG, LIMITED TO BREAKDOWN OF SKIN: Primary | ICD-10-CM

## 2019-05-20 DIAGNOSIS — R60.0 BILATERAL LEG EDEMA: ICD-10-CM

## 2019-05-20 DIAGNOSIS — I87.2 PERIPHERAL VENOUS INSUFFICIENCY: ICD-10-CM

## 2019-05-20 DIAGNOSIS — L97.911 NON-PRESSURE CHRONIC ULCER OF RIGHT LOWER LEG, LIMITED TO BREAKDOWN OF SKIN: ICD-10-CM

## 2019-05-20 DIAGNOSIS — E11.42 TYPE 2 DIABETES MELLITUS WITH DIABETIC POLYNEUROPATHY, WITHOUT LONG-TERM CURRENT USE OF INSULIN: ICD-10-CM

## 2019-05-20 PROCEDURE — 99214 OFFICE O/P EST MOD 30 MIN: CPT | Mod: PBBFAC,PN | Performed by: FAMILY MEDICINE

## 2019-05-20 PROCEDURE — 99214 OFFICE O/P EST MOD 30 MIN: CPT | Mod: S$PBB,25,, | Performed by: FAMILY MEDICINE

## 2019-05-20 PROCEDURE — 29580 STRAPPING UNNA BOOT: CPT | Mod: PBBFAC,PN | Performed by: FAMILY MEDICINE

## 2019-05-20 PROCEDURE — 29580 PR STRAPPING UNNA BOOT: ICD-10-PCS | Mod: S$PBB,RT,, | Performed by: FAMILY MEDICINE

## 2019-05-20 PROCEDURE — 29580 STRAPPING UNNA BOOT: CPT | Mod: S$PBB,RT,, | Performed by: FAMILY MEDICINE

## 2019-05-20 PROCEDURE — 71046 X-RAY EXAM CHEST 2 VIEWS: CPT | Mod: TC,PN

## 2019-05-20 PROCEDURE — 71046 XR CHEST PA AND LATERAL: ICD-10-PCS | Mod: 26,,, | Performed by: RADIOLOGY

## 2019-05-20 PROCEDURE — 71046 X-RAY EXAM CHEST 2 VIEWS: CPT | Mod: 26,,, | Performed by: RADIOLOGY

## 2019-05-20 PROCEDURE — 99999 PR PBB SHADOW E&M-EST. PATIENT-LVL IV: ICD-10-PCS | Mod: PBBFAC,,, | Performed by: FAMILY MEDICINE

## 2019-05-20 PROCEDURE — 99214 PR OFFICE/OUTPT VISIT, EST, LEVL IV, 30-39 MIN: ICD-10-PCS | Mod: S$PBB,25,, | Performed by: FAMILY MEDICINE

## 2019-05-20 PROCEDURE — 99999 PR PBB SHADOW E&M-EST. PATIENT-LVL IV: CPT | Mod: PBBFAC,,, | Performed by: FAMILY MEDICINE

## 2019-05-20 NOTE — PROGRESS NOTES
"Subjective:       Patient ID: James Morejon is a 84 y.o. male.    Chief Complaint: Leg Swelling (reports both legs are swollen and have ulcers on them, loss of appetite)    He is here with his daughter.  He has a past history of venous stasis with extreme edema and recurrent venous stasis ulcers.  She says that over the past week his swelling has worsened and he is developed bilateral open sores which drain.  No fever.  Decreased appetite the past 1-2 weeks.  No chest pain or dyspnea.  He does have a history of CHF but it has been compensated.  He is currently taking Lasix 160 mg daily.    Review of Systems   Constitutional: Positive for appetite change and unexpected weight change. Negative for fever.   Respiratory: Negative for shortness of breath and wheezing.    Cardiovascular: Positive for leg swelling. Negative for chest pain.       Objective:     Blood pressure (!) 150/72, pulse 68, resp. rate 18, height 5' 9" (1.753 m), weight 100 kg (220 lb 9.1 oz).      Physical Exam   Constitutional:   Overweight, alert, no distress.   Cardiovascular: Normal rate and regular rhythm.   Pulmonary/Chest: Effort normal.   Some right posterior dry crackles   Abdominal: Soft. Bowel sounds are normal. There is no tenderness.   Musculoskeletal: He exhibits edema (4+ bilateral edema.).   Skin:   Two large shallow ulcers on the right lower leg.  One tender shallow ulcer on the left lateral lower leg.       Assessment:       1. 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 Type 2 diabetes mellitus with diabetic polyneuropathy, without long-term current use of insulin    3. Bilateral leg edema    4. Peripheral venous insufficiency        Plan:       Wounds were cleaned with water.  Bilateral Unna boots with Ace wraps.  Lab work today.  Resume [facility].      "

## 2019-05-20 NOTE — TELEPHONE ENCOUNTER
----- Message from Cirilo Suggs sent at 5/20/2019  8:54 AM CDT -----  Contact: Danuta  Type:  Patient Returning Call    Who Called:  Danuta patient's daughter  Who Left Message for Patient:  Noé  Does the patient know what this is regarding?:  yes  Best Call Back Number:  [phone]  Additional Information:  Requesting a call back to confirm today's appointment for 2:00pm.stated that she received call from noé regarding a sooner appointement. Will come for 2:00pm today

## 2019-05-20 NOTE — TELEPHONE ENCOUNTER
Attempted to contact patient, no answer. Left voicemail to return call to clinic.    There is a 2pm availability for same day, if call is returned

## 2019-05-20 NOTE — TELEPHONE ENCOUNTER
----- Message from Ginger Jain sent at 5/20/2019  7:01 AM CDT -----  Type: Needs appointment today    Who Called:  Danuta (Daughter)  Best Call Back Number: [phone]  Additional Information: Daughter stated patient's legs are very swollen/would like appointment today/please call back to schedule or advise.

## 2019-05-28 ENCOUNTER — TELEPHONE (OUTPATIENT)
Dept: FAMILY MEDICINE | Facility: CLINIC | Age: 84
End: 2019-05-28

## 2019-05-28 NOTE — TELEPHONE ENCOUNTER
Orders faxed today. Spoke with Nhi and she states that if they do not receive orders today they will call and let us know. Confirmed correct fax number with patient.

## 2019-05-28 NOTE — TELEPHONE ENCOUNTER
----- Message from Abdi Umanzor sent at 5/28/2019  8:11 AM CDT -----  Type: Needs Medical Advice    Who Called: Danuta Morejon (Daughter)    Best Call Back Number: [phone]  Additional Information:  Caller states that she wishes the staff a good morning and hopes that everyone had a good Memorial Day and she would like a callback regarding orders for the patient's home health that needs to faxed to:  [facility]  Fax# [phone], phone # [phone]    Caller states that she would like a response this morning.

## 2019-06-14 ENCOUNTER — TELEPHONE (OUTPATIENT)
Dept: FAMILY MEDICINE | Facility: CLINIC | Age: 84
End: 2019-06-14

## 2019-06-14 NOTE — TELEPHONE ENCOUNTER
----- Message from Jose Almendarez sent at 6/14/2019  9:12 AM CDT -----  Contact: Davida/[facility]  Davida called in and wanted to let office know that she sent patient to the ER, [facility], yesterday 6/13/19.  Patient was sent for green, fowl smelling drainage to right lower leg & increased pain.  Davida stated patient declined her doing lab work because he stated he would have that done in the ER.  Patient was not admitted into hospital.    Davida's call back number is [phone]

## 2019-06-24 ENCOUNTER — OFFICE VISIT (OUTPATIENT)
Dept: FAMILY MEDICINE | Facility: CLINIC | Age: 84
End: 2019-06-24
Payer: MEDICARE

## 2019-06-24 VITALS
HEART RATE: 80 BPM | DIASTOLIC BLOOD PRESSURE: 78 MMHG | TEMPERATURE: 100 F | WEIGHT: 215.38 LBS | BODY MASS INDEX: 31.9 KG/M2 | SYSTOLIC BLOOD PRESSURE: 130 MMHG | HEIGHT: 69 IN

## 2019-06-24 DIAGNOSIS — E11.42 TYPE 2 DIABETES MELLITUS WITH DIABETIC POLYNEUROPATHY, WITHOUT LONG-TERM CURRENT USE OF INSULIN: ICD-10-CM

## 2019-06-24 DIAGNOSIS — I25.10 CORONARY ARTERY DISEASE INVOLVING NATIVE CORONARY ARTERY WITHOUT ANGINA PECTORIS, UNSPECIFIED WHETHER NATIVE OR TRANSPLANTED HEART: ICD-10-CM

## 2019-06-24 DIAGNOSIS — L97.911 NON-PRESSURE CHRONIC ULCER OF RIGHT LOWER LEG, LIMITED TO BREAKDOWN OF SKIN: Primary | ICD-10-CM

## 2019-06-24 DIAGNOSIS — I10 ESSENTIAL HYPERTENSION: ICD-10-CM

## 2019-06-24 DIAGNOSIS — I73.9 PVD (PERIPHERAL VASCULAR DISEASE): ICD-10-CM

## 2019-06-24 DIAGNOSIS — I87.2 PERIPHERAL VENOUS INSUFFICIENCY: ICD-10-CM

## 2019-06-24 PROCEDURE — 99214 OFFICE O/P EST MOD 30 MIN: CPT | Mod: S$PBB,,, | Performed by: FAMILY MEDICINE

## 2019-06-24 PROCEDURE — 99214 PR OFFICE/OUTPT VISIT, EST, LEVL IV, 30-39 MIN: ICD-10-PCS | Mod: S$PBB,,, | Performed by: FAMILY MEDICINE

## 2019-06-24 PROCEDURE — 99499 RISK ADDL DX/OHS AUDIT: ICD-10-PCS | Mod: S$PBB,,, | Performed by: FAMILY MEDICINE

## 2019-06-24 PROCEDURE — 99213 OFFICE O/P EST LOW 20 MIN: CPT | Mod: PBBFAC,PN | Performed by: FAMILY MEDICINE

## 2019-06-24 PROCEDURE — 99999 PR PBB SHADOW E&M-EST. PATIENT-LVL III: CPT | Mod: PBBFAC,,, | Performed by: FAMILY MEDICINE

## 2019-06-24 PROCEDURE — 99499 UNLISTED E&M SERVICE: CPT | Mod: S$PBB,,, | Performed by: FAMILY MEDICINE

## 2019-06-24 PROCEDURE — 99999 PR PBB SHADOW E&M-EST. PATIENT-LVL III: ICD-10-PCS | Mod: PBBFAC,,, | Performed by: FAMILY MEDICINE

## 2019-06-24 RX ORDER — CLINDAMYCIN HYDROCHLORIDE 300 MG/1
300 CAPSULE ORAL 3 TIMES DAILY
Qty: 30 CAPSULE | Refills: 0 | Status: SHIPPED | OUTPATIENT
Start: 2019-06-24 | End: 2019-06-24 | Stop reason: SDUPTHER

## 2019-06-24 RX ORDER — FUROSEMIDE 80 MG/1
160 TABLET ORAL DAILY
Qty: 180 TABLET | Refills: 3 | Status: SHIPPED | OUTPATIENT
Start: 2019-06-24 | End: 2020-03-30

## 2019-06-24 RX ORDER — CLINDAMYCIN HYDROCHLORIDE 300 MG/1
300 CAPSULE ORAL 3 TIMES DAILY
Qty: 30 CAPSULE | Refills: 0 | Status: SHIPPED | OUTPATIENT
Start: 2019-06-24 | End: 2020-05-21

## 2019-06-24 NOTE — PROGRESS NOTES
"Subjective:       Patient ID: James Morejon is a 84 y.o. male.    Chief Complaint: Edema (RLE edema. Went to ED in [place] for poss infection. Was rx'd clindamycin.) and Follow-up    Follow-up dependent edema. Ongoing care for right leg ulcers.  Left leg has healed.  It is not clear that he has been taking any Lasix.  He was seen in the emergency room in [place] and treated with clindamycin.  He had developed quite in odor from his wound.  He says that he has burning leg pain all the way up to his waist when he 1st stands up after sitting.  No fever.  No chills.  He does have a temperature of 100° today.  I reviewed lab work from [place].  It was fairly stable.  His hemoglobin was 10.1.  No white count elevation.  I reviewed his chart and found an arterial ultrasound that showed an RADHA of 0.69.  He does not relate claudication.  He has taken cilostazol in the past.  He has diabetes but is not taking any medication and his blood sugar has been normal.  He has a history of coronary disease but does not relate any chest pain. He has a past history of CHF but his most recent EF was 55%.    Review of Systems   Constitutional: Negative for fever and unexpected weight change.   Respiratory: Negative for cough and shortness of breath.    Cardiovascular: Positive for leg swelling. Negative for chest pain.       Objective:     Blood pressure 130/78, pulse 80, temperature 100 °F (37.8 °C), temperature source Oral, height 5' 9" (1.753 m), weight 97.7 kg (215 lb 6.2 oz).      Physical Exam   Constitutional: He appears well-developed and well-nourished. No distress.   Cardiovascular: Normal rate and regular rhythm.   No murmur heard.  Pulmonary/Chest: Effort normal and breath sounds normal.   Musculoskeletal: He exhibits edema (2+ edema on the left but somewhat tender.  3+ edema on the right.).   Skin:   Two large ulcers swab that are fairly superficial and have good bleeding tissue.  No purulent discharge. No sign of " cellulitis.       Assessment:       1. 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 PVD (peripheral vascular disease)    3. Essential hypertension    4. Coronary artery disease involving native coronary artery without angina pectoris, unspecified whether native or transplanted heart    5. Type 2 diabetes mellitus with diabetic polyneuropathy, without long-term current use of insulin    6. Peripheral venous insufficiency        Plan:       Resume salon stools all for now.  Resume Lasix 160 mg daily.  Silver based dressing and Unna boot on the right.  Ace wrap on the left.  He has home health.

## 2019-07-01 ENCOUNTER — TELEPHONE (OUTPATIENT)
Dept: FAMILY MEDICINE | Facility: CLINIC | Age: 84
End: 2019-07-01

## 2019-07-01 RX ORDER — CEPHALEXIN 500 MG/1
500 CAPSULE ORAL EVERY 8 HOURS
Qty: 30 CAPSULE | Refills: 0 | Status: SHIPPED | OUTPATIENT
Start: 2019-07-01 | End: 2019-07-11 | Stop reason: SDUPTHER

## 2019-07-01 NOTE — TELEPHONE ENCOUNTER
"Spoke w/ Davida. They did not apply the Aquacel AG or any other bandage other than unna boot. She states the 2 open wounds are draining "a lot" of serous fluid and has a bad odor. No redness. Pt does not have fever. Pt was on Clindamycin which pt's daughter d/c'd stating that it was causing him to have "SOB, gagging and choking". She is not sure exactly when the Clindamycin was d/c'd. Please review and advise what to order wound care wise and recommendations regarding the SOB and Clindamycin.  "

## 2019-07-01 NOTE — TELEPHONE ENCOUNTER
No answer. Left msg for Davida to call back. Unable to reach pt or dtr to advise. Will ask Davida if she would relay the info, when she calls back.

## 2019-07-01 NOTE — TELEPHONE ENCOUNTER
Spoke w/ Davida. Advised as recommended. She redressed the leg wounds w/ Rhianna JONES today and will see pt to change this on Thursday. She will let pt and dtr know the rx has been changed and sent to [facility]. She will advise dtr to take pt to ED if SOB persists.

## 2019-07-01 NOTE — TELEPHONE ENCOUNTER
----- Message from Ryan Maciel sent at 7/1/2019 12:50 PM CDT -----  Contact: [place]/ [facility]   Davida/ [facility] need to speak with a nurse regarding a antibiotic that is making patient sick and shortness of breath. Patient leg is still infected, Please call back at [phone]

## 2019-07-12 RX ORDER — CEPHALEXIN 500 MG/1
CAPSULE ORAL
Qty: 30 CAPSULE | Refills: 0 | Status: SHIPPED | OUTPATIENT
Start: 2019-07-12 | End: 2020-05-21

## 2019-07-12 RX ORDER — CLINDAMYCIN HYDROCHLORIDE 300 MG/1
CAPSULE ORAL
Qty: 30 CAPSULE | Refills: 0 | OUTPATIENT
Start: 2019-07-12

## 2019-08-12 ENCOUNTER — TELEPHONE (OUTPATIENT)
Dept: FAMILY MEDICINE | Facility: CLINIC | Age: 84
End: 2019-08-12

## 2019-08-12 RX ORDER — TAMSULOSIN HYDROCHLORIDE 0.4 MG/1
0.8 CAPSULE ORAL
Qty: 180 CAPSULE | Refills: 1 | Status: SHIPPED | OUTPATIENT
Start: 2019-08-12 | End: 2020-03-28

## 2019-08-12 RX ORDER — TAMSULOSIN HYDROCHLORIDE 0.4 MG/1
0.8 CAPSULE ORAL
Qty: 180 CAPSULE | Refills: 1 | Status: SHIPPED | OUTPATIENT
Start: 2019-08-12 | End: 2019-08-12 | Stop reason: SDUPTHER

## 2019-08-12 NOTE — TELEPHONE ENCOUNTER
----- Message from Carly Roca sent at 8/12/2019 10:23 AM CDT -----  Contact: [facility]  Type: Needs Medical Advice    Who Called:  Hermes Rouse Call Back Number: [phone]  Additional Information: Patient has had a 8.6 lbs weight loss since the 8th he weighs 207.4. Wanted to report FELISHA

## 2019-08-29 RX ORDER — POTASSIUM CHLORIDE 750 MG/1
10 CAPSULE, EXTENDED RELEASE ORAL DAILY
Qty: 90 CAPSULE | Refills: 3 | OUTPATIENT
Start: 2019-08-29 | End: 2019-10-14 | Stop reason: SDUPTHER

## 2019-08-29 NOTE — TELEPHONE ENCOUNTER
----- Message from Ernestine Villavicencio sent at 8/29/2019  9:57 AM CDT -----  Contact: Maura from Home care   Patient need refill on potassium chloride (MICRO-K) 10 MEQ CpSR 90 day supply     Please send to     [facility] in [place]  phone number is [phone] , fax [phone]      Patient contact [phone] (home)

## 2019-08-30 ENCOUNTER — TELEPHONE (OUTPATIENT)
Dept: FAMILY MEDICINE | Facility: CLINIC | Age: 84
End: 2019-08-30

## 2019-08-30 NOTE — TELEPHONE ENCOUNTER
----- Message from Amanuel Lange sent at 8/30/2019 12:43 PM CDT -----  Contact: Ana FISHER with [facility]   Type: Needs Medical Advice    Who Called:  Ana FISHER with [facility]   Best Call Back Number: [phone] Fax: [phone]  Additional Information: Requesting wound care orders for patient. Please advise when sent

## 2019-09-12 RX ORDER — CILOSTAZOL 100 MG/1
TABLET ORAL
Qty: 180 TABLET | Refills: 1 | Status: SHIPPED | OUTPATIENT
Start: 2019-09-12 | End: 2020-05-21 | Stop reason: SDUPTHER

## 2019-10-14 RX ORDER — POTASSIUM CHLORIDE 750 MG/1
CAPSULE, EXTENDED RELEASE ORAL
Qty: 31 CAPSULE | Refills: 0 | Status: SHIPPED | OUTPATIENT
Start: 2019-10-14 | End: 2019-11-14 | Stop reason: SDUPTHER

## 2019-10-14 RX ORDER — PRAVASTATIN SODIUM 40 MG/1
TABLET ORAL
Qty: 90 TABLET | Refills: 3 | Status: SHIPPED | OUTPATIENT
Start: 2019-10-14 | End: 2020-05-21 | Stop reason: SDUPTHER

## 2019-11-14 RX ORDER — METOPROLOL TARTRATE 50 MG/1
TABLET ORAL
Qty: 180 TABLET | Refills: 3 | Status: SHIPPED | OUTPATIENT
Start: 2019-11-14 | End: 2020-05-21 | Stop reason: SDUPTHER

## 2019-11-14 RX ORDER — CLOPIDOGREL BISULFATE 75 MG/1
TABLET ORAL
Qty: 90 TABLET | Refills: 3 | Status: SHIPPED | OUTPATIENT
Start: 2019-11-14 | End: 2020-05-21 | Stop reason: SDUPTHER

## 2019-11-14 RX ORDER — POTASSIUM CHLORIDE 750 MG/1
CAPSULE, EXTENDED RELEASE ORAL
Qty: 31 CAPSULE | Refills: 0 | Status: SHIPPED | OUTPATIENT
Start: 2019-11-14 | End: 2020-01-24

## 2019-11-14 RX ORDER — FINASTERIDE 5 MG/1
TABLET, FILM COATED ORAL
Qty: 90 TABLET | Refills: 3 | Status: SHIPPED | OUTPATIENT
Start: 2019-11-14 | End: 2020-05-21 | Stop reason: SDUPTHER

## 2020-01-24 RX ORDER — POTASSIUM CHLORIDE 750 MG/1
CAPSULE, EXTENDED RELEASE ORAL
Qty: 30 CAPSULE | Refills: 3 | Status: SHIPPED | OUTPATIENT
Start: 2020-01-24 | End: 2020-05-21 | Stop reason: SDUPTHER

## 2020-03-28 RX ORDER — TAMSULOSIN HYDROCHLORIDE 0.4 MG/1
CAPSULE ORAL
Qty: 180 CAPSULE | Refills: 1 | Status: SHIPPED | OUTPATIENT
Start: 2020-03-28 | End: 2020-05-21 | Stop reason: SDUPTHER

## 2020-03-30 RX ORDER — FUROSEMIDE 80 MG/1
TABLET ORAL
Qty: 180 TABLET | Refills: 0 | Status: SHIPPED | OUTPATIENT
Start: 2020-03-30 | End: 2020-05-21 | Stop reason: SDUPTHER

## 2020-04-24 ENCOUNTER — TELEPHONE (OUTPATIENT)
Dept: FAMILY MEDICINE | Facility: CLINIC | Age: 85
End: 2020-04-24

## 2020-04-24 DIAGNOSIS — I83.019 STASIS LEG ULCER, RIGHT: ICD-10-CM

## 2020-04-24 DIAGNOSIS — I87.2 PERIPHERAL VENOUS INSUFFICIENCY: ICD-10-CM

## 2020-04-24 DIAGNOSIS — I83.029 STASIS ULCER OF LEFT LOWER EXTREMITY: Primary | ICD-10-CM

## 2020-04-24 DIAGNOSIS — R60.0 BILATERAL LEG EDEMA: ICD-10-CM

## 2020-04-24 DIAGNOSIS — I73.9 PVD (PERIPHERAL VASCULAR DISEASE): ICD-10-CM

## 2020-04-24 DIAGNOSIS — L97.919 STASIS LEG ULCER, RIGHT: ICD-10-CM

## 2020-04-24 DIAGNOSIS — L97.929 STASIS ULCER OF LEFT LOWER EXTREMITY: Primary | ICD-10-CM

## 2020-04-24 NOTE — TELEPHONE ENCOUNTER
Spoke with pt's daughter, she states pt has an ulcer on his right leg and she would like  to come out for wound card as he is not able to travel to clinic for usual js boot. She used [facility]  previously.

## 2020-04-24 NOTE — TELEPHONE ENCOUNTER
----- Message from Lucie Shabazz sent at 4/24/2020  9:14 AM CDT -----  Type: Needs Medical Advice  Who Called:  Danuta Morejon (Daughter)  Best Call Back Number: [phone]  Additional Information: states that the patient's legs has broken down and needs home health. She would like to bring the patient in Monday. Please give call back to schedule. Declined VV.

## 2020-04-30 ENCOUNTER — TELEPHONE (OUTPATIENT)
Dept: FAMILY MEDICINE | Facility: CLINIC | Age: 85
End: 2020-04-30

## 2020-04-30 NOTE — TELEPHONE ENCOUNTER
----- Message from Otilia Valdez sent at 4/30/2020 11:20 AM CDT -----  Type: Needs Medical Advice  Who Called:  Danuta Morejon (Daughter)  Best Call Back Number: [phone]  Additional Information: She is calling to follow up on the home health order.  [facility] has not received it as yet. Kasie's phone number [phone] and fax# is [phone].  Please call her with the status.  Thank you!

## 2020-04-30 NOTE — TELEPHONE ENCOUNTER
----- Message from Paola Malik sent at 4/30/2020  1:03 PM CDT -----  Contact: Ben Morejon   Type:  Patient Returning Call    Who Called:  Reed  Who Left Message for Patient:  Daphney  Does the patient know what this is regarding?:  n/a  Best Call Back Number:  [phone]  Additional Information:  Requesting to leave a message if she dont answer

## 2020-05-01 PROCEDURE — G0180 PR HOME HEALTH MD CERTIFICATION: ICD-10-PCS | Mod: ,,, | Performed by: FAMILY MEDICINE

## 2020-05-01 PROCEDURE — G0180 MD CERTIFICATION HHA PATIENT: HCPCS | Mod: ,,, | Performed by: FAMILY MEDICINE

## 2020-05-05 ENCOUNTER — TELEPHONE (OUTPATIENT)
Dept: FAMILY MEDICINE | Facility: CLINIC | Age: 85
End: 2020-05-05

## 2020-05-05 NOTE — TELEPHONE ENCOUNTER
----- Message from Monserrat Christine sent at 5/5/2020  9:59 AM CDT -----  Contact: Maura  With [facility] [phone]  Maura  With [facility] [phone]  Called and  Asked for a call back about his wound care she is following the orders the patient has a lot of drainage. She is asking if you want anything else done due to the drainage.  Call back [phone]

## 2020-05-08 ENCOUNTER — TELEPHONE (OUTPATIENT)
Dept: FAMILY MEDICINE | Facility: CLINIC | Age: 85
End: 2020-05-08

## 2020-05-08 NOTE — TELEPHONE ENCOUNTER
Spoke w/ Evelina. Pt has not been seen since 7/2019. Medicare guidelines require pt seen face to face w/in 30d of HH order. She is going to reach out to dtr and advise and recommend they contact us for an appt.

## 2020-05-08 NOTE — TELEPHONE ENCOUNTER
----- Message from Ginny Giron sent at 5/8/2020 12:01 PM CDT -----  Contact: Evelina maldonado/ [facility]  Type: Needs medical advice      Who Called: Evelina Rouse Call Back Number:   [phone]  Additional Information:   Requesting office notes signed by the Doctor and a H&P. Please fax to [phone]

## 2020-05-11 ENCOUNTER — TELEPHONE (OUTPATIENT)
Dept: FAMILY MEDICINE | Facility: CLINIC | Age: 85
End: 2020-05-11

## 2020-05-11 NOTE — TELEPHONE ENCOUNTER
----- Message from Ernestine Villavicencio MA sent at 5/11/2020 10:29 AM CDT -----  Contact: patient daughter Danuta   Patient Danuta need to speak to nurse regarding scheduling patent appt for medicaid to pay for home health , patient need face to face (need before end of month ) will like appt Wednesday ,Thursday or Friday     Epic would not allow scheduling     Patient sister states her phone does not have capability to schedule virtual        Please call patient sister Danuta at [phone] leave  twice if no answer due please per patient daughter Danuta

## 2020-05-14 ENCOUNTER — EXTERNAL HOME HEALTH (OUTPATIENT)
Dept: HOME HEALTH SERVICES | Facility: HOSPITAL | Age: 85
End: 2020-05-14
Payer: MEDICARE

## 2020-05-21 ENCOUNTER — LAB VISIT (OUTPATIENT)
Dept: LAB | Facility: HOSPITAL | Age: 85
End: 2020-05-21
Attending: FAMILY MEDICINE
Payer: MEDICARE

## 2020-05-21 ENCOUNTER — OFFICE VISIT (OUTPATIENT)
Dept: FAMILY MEDICINE | Facility: CLINIC | Age: 85
End: 2020-05-21
Payer: MEDICARE

## 2020-05-21 VITALS — BODY MASS INDEX: 30.94 KG/M2 | WEIGHT: 209.56 LBS | TEMPERATURE: 100 F

## 2020-05-21 DIAGNOSIS — I10 ESSENTIAL HYPERTENSION: ICD-10-CM

## 2020-05-21 DIAGNOSIS — L97.911 NON-PRESSURE CHRONIC ULCER OF RIGHT LOWER LEG, LIMITED TO BREAKDOWN OF SKIN: ICD-10-CM

## 2020-05-21 DIAGNOSIS — E11.42 TYPE 2 DIABETES MELLITUS WITH DIABETIC POLYNEUROPATHY, WITHOUT LONG-TERM CURRENT USE OF INSULIN: ICD-10-CM

## 2020-05-21 DIAGNOSIS — I73.9 PVD (PERIPHERAL VASCULAR DISEASE): ICD-10-CM

## 2020-05-21 DIAGNOSIS — L97.929 VENOUS STASIS ULCERS OF BOTH LOWER EXTREMITIES: ICD-10-CM

## 2020-05-21 DIAGNOSIS — I83.029 VENOUS STASIS ULCERS OF BOTH LOWER EXTREMITIES: ICD-10-CM

## 2020-05-21 DIAGNOSIS — R60.0 BILATERAL LEG EDEMA: ICD-10-CM

## 2020-05-21 DIAGNOSIS — I83.019 VENOUS STASIS ULCERS OF BOTH LOWER EXTREMITIES: ICD-10-CM

## 2020-05-21 DIAGNOSIS — L97.919 VENOUS STASIS ULCERS OF BOTH LOWER EXTREMITIES: ICD-10-CM

## 2020-05-21 DIAGNOSIS — B35.1 ONYCHOMYCOSIS: ICD-10-CM

## 2020-05-21 DIAGNOSIS — I87.2 PERIPHERAL VENOUS INSUFFICIENCY: ICD-10-CM

## 2020-05-21 DIAGNOSIS — I10 ESSENTIAL HYPERTENSION: Primary | ICD-10-CM

## 2020-05-21 DIAGNOSIS — R53.81 DEBILITY: ICD-10-CM

## 2020-05-21 DIAGNOSIS — I25.10 CORONARY ARTERY DISEASE INVOLVING NATIVE CORONARY ARTERY WITHOUT ANGINA PECTORIS, UNSPECIFIED WHETHER NATIVE OR TRANSPLANTED HEART: ICD-10-CM

## 2020-05-21 PROCEDURE — 29580 PR STRAPPING UNNA BOOT: ICD-10-PCS | Mod: S$PBB,50,, | Performed by: FAMILY MEDICINE

## 2020-05-21 PROCEDURE — 99999 PR PBB SHADOW E&M-EST. PATIENT-LVL III: CPT | Mod: PBBFAC,,, | Performed by: FAMILY MEDICINE

## 2020-05-21 PROCEDURE — 36415 COLL VENOUS BLD VENIPUNCTURE: CPT | Mod: PN

## 2020-05-21 PROCEDURE — 99213 OFFICE O/P EST LOW 20 MIN: CPT | Mod: PBBFAC,PN,25 | Performed by: FAMILY MEDICINE

## 2020-05-21 PROCEDURE — 29580 STRAPPING UNNA BOOT: CPT | Mod: S$PBB,50,, | Performed by: FAMILY MEDICINE

## 2020-05-21 PROCEDURE — 99214 PR OFFICE/OUTPT VISIT, EST, LEVL IV, 30-39 MIN: ICD-10-PCS | Mod: 25,S$PBB,, | Performed by: FAMILY MEDICINE

## 2020-05-21 PROCEDURE — 83036 HEMOGLOBIN GLYCOSYLATED A1C: CPT

## 2020-05-21 PROCEDURE — 80061 LIPID PANEL: CPT

## 2020-05-21 PROCEDURE — 11721 DEBRIDE NAIL 6 OR MORE: CPT | Mod: PBBFAC,PN | Performed by: FAMILY MEDICINE

## 2020-05-21 PROCEDURE — 11721 PR DEBRIDEMENT OF NAILS, 6 OR MORE: ICD-10-PCS | Mod: S$PBB,59,, | Performed by: FAMILY MEDICINE

## 2020-05-21 PROCEDURE — 80053 COMPREHEN METABOLIC PANEL: CPT

## 2020-05-21 PROCEDURE — 99999 PR PBB SHADOW E&M-EST. PATIENT-LVL III: ICD-10-PCS | Mod: PBBFAC,,, | Performed by: FAMILY MEDICINE

## 2020-05-21 PROCEDURE — 85027 COMPLETE CBC AUTOMATED: CPT

## 2020-05-21 PROCEDURE — 99214 OFFICE O/P EST MOD 30 MIN: CPT | Mod: 25,S$PBB,, | Performed by: FAMILY MEDICINE

## 2020-05-21 PROCEDURE — 11721 DEBRIDE NAIL 6 OR MORE: CPT | Mod: S$PBB,59,, | Performed by: FAMILY MEDICINE

## 2020-05-21 PROCEDURE — 29580 STRAPPING UNNA BOOT: CPT | Mod: PBBFAC,PN | Performed by: FAMILY MEDICINE

## 2020-05-21 RX ORDER — TAMSULOSIN HYDROCHLORIDE 0.4 MG/1
CAPSULE ORAL
Qty: 180 CAPSULE | Refills: 3 | Status: SHIPPED | OUTPATIENT
Start: 2020-05-21

## 2020-05-21 RX ORDER — PRAVASTATIN SODIUM 40 MG/1
40 TABLET ORAL DAILY
Qty: 90 TABLET | Refills: 3 | Status: SHIPPED | OUTPATIENT
Start: 2020-05-21

## 2020-05-21 RX ORDER — FUROSEMIDE 80 MG/1
160 TABLET ORAL DAILY
Qty: 180 TABLET | Refills: 3 | Status: SHIPPED | OUTPATIENT
Start: 2020-05-21

## 2020-05-21 RX ORDER — CILOSTAZOL 100 MG/1
100 TABLET ORAL 2 TIMES DAILY
Qty: 180 TABLET | Refills: 3 | Status: SHIPPED | OUTPATIENT
Start: 2020-05-21

## 2020-05-21 RX ORDER — POTASSIUM CHLORIDE 750 MG/1
10 CAPSULE, EXTENDED RELEASE ORAL DAILY
Qty: 90 CAPSULE | Refills: 3 | Status: SHIPPED | OUTPATIENT
Start: 2020-05-21

## 2020-05-21 RX ORDER — METOPROLOL TARTRATE 50 MG/1
50 TABLET ORAL 2 TIMES DAILY
Qty: 180 TABLET | Refills: 3 | Status: SHIPPED | OUTPATIENT
Start: 2020-05-21

## 2020-05-21 RX ORDER — CLOPIDOGREL BISULFATE 75 MG/1
75 TABLET ORAL DAILY
Qty: 90 TABLET | Refills: 3 | Status: SHIPPED | OUTPATIENT
Start: 2020-05-21

## 2020-05-21 RX ORDER — FINASTERIDE 5 MG/1
5 TABLET, FILM COATED ORAL DAILY
Qty: 90 TABLET | Refills: 3 | Status: SHIPPED | OUTPATIENT
Start: 2020-05-21

## 2020-05-21 NOTE — PROGRESS NOTES
"Subjective:       Patient ID: James Morejon is a 85 y.o. male.    Chief Complaint: Home Health Review (Pt here for evaluation for orders for home health) and Wound Check (Right leg)    He is here with his daughter.  He has been getting home health for leg edema and a right lower leg stasis ulcer.  It has been improving.  He has bilateral Unna boots and Ace wraps.  He has long toenails which need some care.  He has a history of coronary disease but is not complaining of chest pain.  He has a history of CHF and I suspect it is mostly diastolic dysfunction.  He does have orthopnea and leg edema.  He is due for lab work.  He does have some memory issues.  He seems to get disoriented at times.  I reviewed and updated his medication list.    Review of Systems   Constitutional: Positive for activity change (He is a little less active because of the covid quarantine.) and unexpected weight change ( he has lost 5 lb). Negative for appetite change.   Respiratory: Positive for shortness of breath. Negative for cough and chest tightness.    Cardiovascular: Positive for leg swelling. Negative for chest pain and palpitations.   Gastrointestinal: Negative for abdominal pain.       Objective:     Blood pressure (P) 136/82, pulse (P) 92, temperature 99.9 °F (37.7 °C), temperature source Oral, resp. rate (!) (P) 22, height (P) 5' 9" (1.753 m), weight 95.1 kg (209 lb 8.8 oz).   Repeat temperature 99.0°.    Physical Exam   Constitutional: He appears well-developed and well-nourished. No distress.   Cardiovascular: Normal rate and regular rhythm.   Murmur (Grade 1 systolic murmur heard left lower sternal border) heard.  Pulmonary/Chest: Effort normal.   He does have a few rhonchi in the bases more so on the right.  Mild JVD.   Abdominal: Soft. Bowel sounds are normal. There is no tenderness.   Musculoskeletal: He exhibits edema.   2+ edema that is fairly well controlled with Unna boot and Ace wrap.  Legs are nontender.   Neurological: " He is alert.   Skin:   2 x 6 cm ulcer on the right lateral calf.  It looks clean and seems to be healing.  I reamed bandaged with Pogrom Ag, Unna boot, Ace wrap.       Assessment:       1. Essential hypertension    2. Type 2 diabetes mellitus with diabetic polyneuropathy, without long-term current use of insulin    3. 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 Debility    5. Coronary artery disease involving native coronary artery without angina pectoris, unspecified whether native or transplanted heart    6. Bilateral leg edema    7. PVD (peripheral vascular disease)    8. Venous stasis ulcers of both lower extremities    9. Peripheral venous insufficiency        Plan:       I debrided 10 toenails that he was unable to care for himself. Thick onychomycotic nails in a high risk patient due to PVD.  Lab work today.  Continue current medication.  Continue home health.  I think physical therapy is reasonable because he is unstable and is at risk for falls.

## 2020-05-22 LAB
ALBUMIN SERPL BCP-MCNC: 2.8 G/DL (ref 3.5–5.2)
ALP SERPL-CCNC: 65 U/L (ref 55–135)
ALT SERPL W/O P-5'-P-CCNC: 8 U/L (ref 10–44)
ANION GAP SERPL CALC-SCNC: 8 MMOL/L (ref 8–16)
AST SERPL-CCNC: 19 U/L (ref 10–40)
BILIRUB SERPL-MCNC: 0.6 MG/DL (ref 0.1–1)
BUN SERPL-MCNC: 28 MG/DL (ref 8–23)
CALCIUM SERPL-MCNC: 9 MG/DL (ref 8.7–10.5)
CHLORIDE SERPL-SCNC: 105 MMOL/L (ref 95–110)
CHOLEST SERPL-MCNC: 143 MG/DL (ref 120–199)
CHOLEST/HDLC SERPL: 2.2 {RATIO} (ref 2–5)
CO2 SERPL-SCNC: 28 MMOL/L (ref 23–29)
CREAT SERPL-MCNC: 1.4 MG/DL (ref 0.5–1.4)
ERYTHROCYTE [DISTWIDTH] IN BLOOD BY AUTOMATED COUNT: 16.2 % (ref 11.5–14.5)
EST. GFR  (AFRICAN AMERICAN): 53.3 ML/MIN/1.73 M^2
EST. GFR  (NON AFRICAN AMERICAN): 46.1 ML/MIN/1.73 M^2
ESTIMATED AVG GLUCOSE: 117 MG/DL (ref 68–131)
GLUCOSE SERPL-MCNC: 83 MG/DL (ref 70–110)
HBA1C MFR BLD HPLC: 5.7 % (ref 4–5.6)
HCT VFR BLD AUTO: 30.9 % (ref 40–54)
HDLC SERPL-MCNC: 64 MG/DL (ref 40–75)
HDLC SERPL: 44.8 % (ref 20–50)
HGB BLD-MCNC: 9.1 G/DL (ref 14–18)
LDLC SERPL CALC-MCNC: 69.2 MG/DL (ref 63–159)
MCH RBC QN AUTO: 26.9 PG (ref 27–31)
MCHC RBC AUTO-ENTMCNC: 29.4 G/DL (ref 32–36)
MCV RBC AUTO: 91 FL (ref 82–98)
NONHDLC SERPL-MCNC: 79 MG/DL
PLATELET # BLD AUTO: 147 K/UL (ref 150–350)
PMV BLD AUTO: 11.5 FL (ref 9.2–12.9)
POTASSIUM SERPL-SCNC: 4 MMOL/L (ref 3.5–5.1)
PROT SERPL-MCNC: 7.6 G/DL (ref 6–8.4)
RBC # BLD AUTO: 3.38 M/UL (ref 4.6–6.2)
SODIUM SERPL-SCNC: 141 MMOL/L (ref 136–145)
TRIGL SERPL-MCNC: 49 MG/DL (ref 30–150)
WBC # BLD AUTO: 4.6 K/UL (ref 3.9–12.7)

## 2020-05-27 ENCOUNTER — TELEPHONE (OUTPATIENT)
Dept: FAMILY MEDICINE | Facility: CLINIC | Age: 85
End: 2020-05-27

## 2020-05-27 NOTE — TELEPHONE ENCOUNTER
----- Message from Ginger Jain sent at 5/27/2020 10:44 AM CDT -----  Type: Needs information    Who Called:  Yamilbi with [facility]  Best Call Back Number:[phone]  Additional Information: Requesting copy of office visit notes from May 21st be fax to [phone] or call if any questions.

## 2020-05-27 NOTE — TELEPHONE ENCOUNTER
Conner states last office visit note needs to be printed and signed and dated and faxed back to her as Medicare will not cover HH if face to face is not signed.   Being addressed in office by Nhi Pérez LPN

## 2020-05-27 NOTE — TELEPHONE ENCOUNTER
----- Message from Zayra Prieto sent at 5/27/2020  1:55 PM CDT -----  Contact: Home Health Nurse/ Conner Prieto Home Health Nurse requesting to speak with Dr Benavides     Please call and advise    Phone [phone]

## 2020-06-12 ENCOUNTER — TELEPHONE (OUTPATIENT)
Dept: FAMILY MEDICINE | Facility: CLINIC | Age: 85
End: 2020-06-12

## 2020-06-12 NOTE — TELEPHONE ENCOUNTER
----- Message from Cirilo Suggs sent at 6/12/2020 10:30 AM CDT -----  Contact: Micheline  Type: Needs Medical Advice  Who Called:  Micheline with [facility] home care  Symptoms (please be specific):    How long has patient had these symptoms:    Pharmacy name and phone #:    Best Call Back Number: [phone]  Additional Information: requesting chart notes from 05/21/2020 and 's signature for patient fax to

## 2020-06-16 ENCOUNTER — DOCUMENT SCAN (OUTPATIENT)
Dept: HOME HEALTH SERVICES | Facility: HOSPITAL | Age: 85
End: 2020-06-16
Payer: MEDICARE

## 2020-11-18 ENCOUNTER — PES CALL (OUTPATIENT)
Dept: ADMINISTRATIVE | Facility: CLINIC | Age: 85
End: 2020-11-18

## 2021-01-01 NOTE — TELEPHONE ENCOUNTER
----- Message from Orlando Abad sent at 2/16/2018  2:36 PM CST -----  Contact: Daughter- Danuta Morejon [phone] call back no later than 3:30 pm today  Patient's daughter  asking which [facility] pharmacy was the sent all prescribed rx for the patient.   Thanks!    
Lm on daughters vm about what pharmacy pt medications were sent in oct.  
Pt daughter informed that pt was given a 90 day supply with one refill on 10/2/17  
Spoke with pharmacy. Pt still has the original rx from oct with 1 refill  
39.5

## 2021-02-03 ENCOUNTER — TELEPHONE (OUTPATIENT)
Dept: FAMILY MEDICINE | Facility: CLINIC | Age: 86
End: 2021-02-03

## 2021-02-08 ENCOUNTER — TELEPHONE (OUTPATIENT)
Dept: FAMILY MEDICINE | Facility: CLINIC | Age: 86
End: 2021-02-08

## 2024-03-08 NOTE — TELEPHONE ENCOUNTER
----- Message from Davidedenaamada Raheem sent at 8/30/2019  4:18 PM CDT -----  Contact: Ana Kasie [facility]   Type:  Patient Returning Call    Who Called: [facility]   Who Left Message for Patient: Noé   Does the patient know what this is regarding?:  n/a  Best Call Back Number:  [phone]              
Gave verbal to use preferred treatment for ulcerations on left leg, twice weekly as previously using on ulceration to right leg. Understanding verbalized  
intact